# Patient Record
Sex: FEMALE | Race: WHITE | Employment: FULL TIME | URBAN - METROPOLITAN AREA
[De-identification: names, ages, dates, MRNs, and addresses within clinical notes are randomized per-mention and may not be internally consistent; named-entity substitution may affect disease eponyms.]

---

## 2017-09-06 ENCOUNTER — TELEPHONE (OUTPATIENT)
Dept: OBGYN | Facility: CLINIC | Age: 32
End: 2017-09-06

## 2017-09-06 DIAGNOSIS — Z34.91 NORMAL PREGNANCY IN FIRST TRIMESTER: Primary | ICD-10-CM

## 2017-09-06 NOTE — TELEPHONE ENCOUNTER
Patient called and would like to establish prenatal care   Patient LMP 8/6/17  Patient G1 P  Patient complains of having breast tenderness  Patient is taking prenatal vitamins

## 2017-10-02 ENCOUNTER — OFFICE VISIT (OUTPATIENT)
Dept: OBGYN | Facility: CLINIC | Age: 32
End: 2017-10-02
Attending: ADVANCED PRACTICE MIDWIFE
Payer: COMMERCIAL

## 2017-10-02 VITALS
BODY MASS INDEX: 27.82 KG/M2 | DIASTOLIC BLOOD PRESSURE: 77 MMHG | HEART RATE: 73 BPM | SYSTOLIC BLOOD PRESSURE: 126 MMHG | WEIGHT: 164.6 LBS

## 2017-10-02 DIAGNOSIS — Z34.91 NORMAL PREGNANCY IN FIRST TRIMESTER: ICD-10-CM

## 2017-10-02 DIAGNOSIS — O20.0 THREATENED ABORTION: Primary | ICD-10-CM

## 2017-10-02 PROCEDURE — 99212 OFFICE O/P EST SF 10 MIN: CPT | Mod: 25,ZF

## 2017-10-02 PROCEDURE — 76817 TRANSVAGINAL US OBSTETRIC: CPT | Mod: ZF

## 2017-10-02 RX ORDER — PRENATAL VIT/IRON FUM/FOLIC AC 27MG-0.8MG
1 TABLET ORAL DAILY
COMMUNITY
End: 2017-11-01

## 2017-10-02 NOTE — MR AVS SNAPSHOT
"              After Visit Summary   10/2/2017    Aspen Gonzalez    MRN: [id number]           Patient Information     Date Of Birth          1985        Visit Information        Provider Department      10/2/2017 1:30 PM Megan Ramirez APRN CNM [facility]        Today's Diagnoses     Threatened     -  1       Follow-ups after your visit        Follow-up notes from your care team     Discussed this visit Return in about 2 weeks (around 10/16/2017) for US and CNM follow up.      Your next 10 appointments already scheduled     Oct 16, 2017  9:30 AM CDT   ULTRASOUND with [facility] ULTRASOUND   [facility] ([facility])    [facility]  [address]  [facility] [address]   [phone]            Oct 16, 2017 10:00 AM CDT   OB INTAKE with MAXIMILIANO White CNM   [facility] ([facility])    [facility]  [address]  [facility] [address]   [phone]           Congratulations! You're Pregnant.  At [facility] we think of you as part of our team, working together toward a successful pregnancy and a healthy baby.  You might already have questions about all the different things that are happening to your body.  We have attached a link to our book \"The Expectant Family- From Pregnancy through Childbirth\" [email or web address]/830213.pdf to help answer some of those concerns. (You will be given a hard copy at your first visit in clinic)  Chapter 2 (page 20) is a must read- from questions about morning sickness, headaches, warning signs to look for, to:\"why do I have to go to the bathroom so often?\"   Our Doctors, Resident Physicians, Certified nurse midwives do work closely together as a team both in clinic and in the hospital to make this experience remarkable. Our patients have on numerous occasions expressed " their satisfaction in knowing that there is always a provider at the hospital or on the phone no matter what time of the day it is, to talk, triage or deliver their babies.  Your time is very important to us, so we will like you to know what to expect.  Routine Prenatal Visit Schedule:  Visit 1: 8 Weeks - Dating Ultrasound and Intake with Nurse  Visit 2: 10-12 Weeks- First Prenatal Visit & Exam with Midwife or Resident Physician, scheduling for early genetic screening at [facility] (optional)  Visit 3: 16-18 Weeks 18-20 Weeks- Level II Ultrasound done at [facility]- 4th Floor  Visit 4: 22-24 weeks  Visit 5: 28 Weeks- Extended Education Visit with Midwife or Resident Physician  Visit 6: 32 Weeks  Visit 7: 36 Weeks  Visit 8-11: 38-41 Weeks (Weekly Visits)  Changes can or may occur during your pregnancy. Your provider may ask you to come in more often for testing or monitoring frequently than stated above.  If you still have questions our triage nurses will be more than welcome to help you. Send us a message via MY Chart, our secure health Portal or give us a call anytime at [phone].  Thank You for allowing us to be part of your care.  Yours sincerely,  [facility]              Who to contact     Please call your clinic at [phone] to:    Ask questions about your health    Make or cancel appointments    Discuss your medicines    Learn about your test results    Speak to your doctor   If you have compliments or concerns about an experience at your clinic, or if you wish to file a complaint, please contact [facility] Patient Relations at [phone] or email us at [email or web address] [email or web address]         Additional Information About Your Visit        MyChart Information     [employer]hart gives you secure access to your electronic health record. If you see a primary care provider, you can also send messages to your care team and make appointments. If you have  questions, please call your primary care clinic.  If you do not have a primary care provider, please call [phone] and they will assist you.      IP[employer]less is an electronic gateway that provides easy, online access to your medical records. With IPWireless, you can request a clinic appointment, read your test results, renew a prescription or communicate with your care team.     To access your existing account, please contact your [facility] or call [phone] for assistance.        Care EveryWhere ID     This is your Care EveryWhere ID. This could be used by other organizations to access your [facility] medical records  [id number]        Your Vitals Were     Pulse BMI (Body Mass Index)                73 27.82 kg/m2           Blood Pressure from Last 3 Encounters:   10/02/17 126/77   03/19/14 106/71    Weight from Last 3 Encounters:   10/02/17 74.7 kg (164 lb 9.6 oz)   03/19/14 68.9 kg (152 lb)              Today, you had the following     No orders found for display       Primary Care Provider Office Phone # Fax #    Maikel Apple [phone]       [facility] [address]   [facility] [address]        Equal Access to Services     GLYNN PIERCE : Hadii brent ku hadasho Soomaali, waaxda luqadaha, qaybta kaalmada adeegyada, crissy corbin. So [facility] [phone].    ATENCIÓN: Si habla español, tiene a shafer disposición servicios gratuitos de asistencia lingüística. Llame al [phone].    We comply with applicable federal civil rights laws and [place] laws. We do not discriminate on the basis of race, color, national origin, age, disability, sex, sexual orientation, or gender identity.            Thank you!     Thank you for choosing [facility]  for your care. Our goal is always to provide you with excellent care. Hearing back from our patients is one way we can continue to improve our  services. Please take a few minutes to complete the written survey that you may receive in the mail after your visit with us. Thank you!             Your Updated Medication List - Protect others around you: Learn how to safely use, store and throw away your medicines at www.disposemymeds.org.          This list is accurate as of: 10/2/17 11:59 PM.  Always use your most recent med list.                   Brand Name Dispense Instructions for use Diagnosis    prenatal multivitamin plus iron 27-0.8 MG Tabs per tablet      Take 1 tablet by mouth daily

## 2017-10-02 NOTE — LETTER
10/2/2017     RE: Aspen Gonzalez  [address] [address]     Dear Colleague,    Thank you for referring your patient, Aspen Gonzalez, to the [facility] at [facility]. Please see a copy of my visit note below.    32 year old female, ., with LMP 17 8 1/7weeks,  Estimated Date of Delivery: 18  presents for confirmation of dates and assessment of viability. This study was done transvaginally.    Measurements     CRL = 5.6 mm = 6 12/7 weeks  EGA.   JESSIE = 18.     Fetal anatomy appears abnormal for gestational age. Irrgegular gestational sac and yolk sac.     Fetal/Fetal Cardiac Activity: Absent.       Implantation: Intrauterine.     Cervix = 4.1 cm      Maternal structures:    Right ovarian hemorrhagic cyst-2.1 x 1.7 x 2.0cm    Impression: Missed  versus early pregnancy.  CRL < 7 mm.  Given JESSIE by LMP and appearance of pregnancy on US today, likely missed .  Consider repeat scan in 1-2 week to confirm viability of pregnancy    Recommend comprehensive scan at 18 to 20 weeks.    MEGHANA Chen MD

## 2017-10-02 NOTE — PROGRESS NOTES
Chief Complaint:  Missed AB vs Early pregnancy    SUBJECTIVE: Aspen Gonzalez is a 32 year old female    At 8w1d  by LMP but 6w2d by US with no cardiac activity to review result of ultrasound.      Pt had US today that showed CRL 5.6mm with no fetal heart rate, irregular gestational sac dates ~2 weeks earlier than she expected by exact dates of conception.   The patient has been experiencing no bleeding or cramping.     Review Of Systems   ROS: 10 point ROS neg other than the symptoms noted above in the HPI.      OBJECTIVE: Blood pressure 126/77, pulse 73, weight 74.7 kg (164 lb 9.6 oz).   Physical exam is deferred.         ASSESSMENT:  -  Missed AB vs early pregnancy   - Rh positve per pt     PLAN:   - Offered support, encouraged self care.  Offered coordination with mental health services prn.    - Reviewed inconclusive ultrasound per [facility] consensus - follow up in 2 weeks and prn.   -  Reviewed how/why to call or present to the emergency room if she were to develop heavy bleeding saturating a maxi pad more frequently than every hour or passing large clots.  Rhogam not indicated    All pt's and partner's questions were discussed and answered. Pt and her partner verbalized understanding of and agreement to plan of care.      100% of this 30 minute appointment was spent in face to face counseling and coordination care as above.      Megan VIERA CNM

## 2017-10-02 NOTE — LETTER
10/2/2017       RE: Aspen Gonzalez  [address] [address]     Dear Colleague,    Thank you for referring your patient, Aspen Gonzalez, to the [facility] at [facility]. Please see a copy of my visit note below.    Chief Complaint:  Missed AB vs Early pregnancy    SUBJECTIVE: Aspen Gonzalez is a 32 year old female    At 8w1d  by LMP but 6w2d by US with no cardiac activity to review result of ultrasound.      Pt had US today that showed CRL 5.6mm with no fetal heart rate, irregular gestational sac dates ~2 weeks earlier than she expected by exact dates of conception.   The patient has been experiencing no bleeding or cramping.     Review Of Systems   ROS: 10 point ROS neg other than the symptoms noted above in the HPI.      OBJECTIVE: Blood pressure 126/77, pulse 73, weight 74.7 kg (164 lb 9.6 oz).   Physical exam is deferred.         ASSESSMENT:  -  Missed AB vs early pregnancy   - Rh positve per pt     PLAN:   - Offered support, encouraged self care.  Offered coordination with mental health services prn.    - Reviewed inconclusive ultrasound per [facility] consensus - follow up in 2 weeks and prn.   -  Reviewed how/why to call or present to the emergency room if she were to develop heavy bleeding saturating a maxi pad more frequently than every hour or passing large clots.  Rhogam not indicated    All pt's and partner's questions were discussed and answered. Pt and her partner verbalized understanding of and agreement to plan of care.      100% of this 30 minute appointment was spent in face to face counseling and coordination care as above.      Megan VIERA CNM          Again, thank you for allowing me to participate in the care of your patient.      Sincerely,    MAXIMILIANO Davis CNM

## 2017-10-02 NOTE — MR AVS SNAPSHOT
After Visit Summary   10/2/2017    Aspen Gonzalez    MRN: [id number]           Patient Information     Date Of Birth          1985        Visit Information        Provider Department      10/2/2017 1:00 PM [facility] ULTRASOUND [facility]        Today's Diagnoses     Normal pregnancy in first trimester           Follow-ups after your visit        Your next 10 appointments already scheduled     Oct 16, 2017  9:30 AM CDT   ULTRASOUND with [facility] ULTRASOUND   [facility] ([facility])    [facility] [address]  [address]  [address]  [facility] [address] [phone]            Oct 16, 2017 10:00 AM CDT   NEW OB with MAXIMILIANO White CNM   [facility] ([facility])    [facility]  [address]  [address]  [facility] [address] [phone]              Who to contact     Please call your clinic at [phone] to:    Ask questions about your health    Make or cancel appointments    Discuss your medicines    Learn about your test results    Speak to your doctor   If you have compliments or concerns about an experience at your clinic, or if you wish to file a complaint, please contact [facility] Patient Relations at [phone] or email us at [email or web address]         Additional Information About Your Visit        MyChart Information     [employer] gives you secure access to your electronic health record. If you see a primary care provider, you can also send messages to your care team and make appointments. If you have questions, please call your primary care clinic.  If you do not have a primary care provider, please call [phone] and they will assist you.      [employer] is an electronic gateway that provides easy, online access to your medical records. With [employer], you can request a clinic appointment,  read your test results, renew a prescription or communicate with your care team.     To access your existing account, please contact your [facility] or call [phone] for assistance.        Care EveryWhere ID     This is your Care EveryWhere ID. This could be used by other organizations to access your [place] medical records  [id number]         Blood Pressure from Last 3 Encounters:   10/02/17 126/77   03/19/14 106/71    Weight from Last 3 Encounters:   10/02/17 74.7 kg (164 lb 9.6 oz)   03/19/14 68.9 kg (152 lb)              We Performed the Following     Dating ultrasound less than 14 weeks gestation Transvag  - [id number] (In Clinic)        Primary Care Provider Office Phone # Fax #    Maikel Apple -[phone]       [facility] [address]   [facility] [address]        Equal Access to Services     [facility] : Hadii aad ku hadasho Soomaali, waaxda luqadaha, qaybta kaalmada adeegyada, waxay idiin hayaan yesika boatengarakarla ruiz . So [facility] [phone].    ATENCIÓN: Si habla español, tiene a shafer disposición servicios gratuitos de asistencia lingüística. Llame al [phone].    We comply with applicable federal civil rights laws and [place] laws. We do not discriminate on the basis of race, color, national origin, age, disability, sex, sexual orientation, or gender identity.            Thank you!     Thank you for choosing [facility]  for your care. Our goal is always to provide you with excellent care. Hearing back from our patients is one way we can continue to improve our services. Please take a few minutes to complete the written survey that you may receive in the mail after your visit with us. Thank you!             Your Updated Medication List - Protect others around you: Learn how to safely use, store and throw away your medicines at www.disposemymeds.org.          This list is accurate as of:  10/2/17  3:38 PM.  Always use your most recent med list.                   Brand Name Dispense Instructions for use Diagnosis    prenatal multivitamin plus iron 27-0.8 MG Tabs per tablet      Take 1 tablet by mouth daily

## 2017-10-02 NOTE — LETTER
10/2/2017      RE: Aspen Gonzalez  [address]  [place] [address]       32 year old female, ., with LMP 17 8 1/7weeks,  Estimated Date of Delivery: 18  presents for confirmation of dates and assessment of viability. This study was done transvaginally.    Measurements     CRL = 5.6 mm = 6 12/7 weeks  EGA.   JESSIE = 18.     Fetal anatomy appears abnormal for gestational age. Irrgegular gestational sac and yolk sac.     Fetal/Fetal Cardiac Activity: Absent.       Implantation: Intrauterine.     Cervix = 4.1 cm      Maternal structures:    Right ovarian hemorrhagic cyst-2.1 x 1.7 x 2.0cm    Impression: Missed  versus early pregnancy.  CRL < 7 mm.  Given JESSIE by LMP and appearance of pregnancy on US today, likely missed .  Consider repeat scan in 1-2 week to confirm viability of pregnancy    Recommend comprehensive scan at 18 to 20 weeks.    MEGHANA Chen MD    S Ultrasound

## 2017-10-02 NOTE — PROGRESS NOTES
32 year old female, ., with LMP 17 8 1/7weeks,  Estimated Date of Delivery: 18  presents for confirmation of dates and assessment of viability. This study was done transvaginally.    Measurements     CRL = 5.6 mm = 6 12/7 weeks  EGA.   JESSIE = 18.     Fetal anatomy appears abnormal for gestational age. Irrgegular gestational sac and yolk sac.     Fetal/Fetal Cardiac Activity: Absent.       Implantation: Intrauterine.     Cervix = 4.1 cm      Maternal structures:    Right ovarian hemorrhagic cyst-2.1 x 1.7 x 2.0cm    Impression: Missed  versus early pregnancy.  CRL < 7 mm.  Given JESSIE by LMP and appearance of pregnancy on US today, likely missed .  Consider repeat scan in 1-2 week to confirm viability of pregnancy    Recommend comprehensive scan at 18 to 20 weeks.    MEGHANA Chen MD

## 2017-10-05 PROBLEM — O20.0 THREATENED ABORTION: Status: ACTIVE | Noted: 2017-10-05

## 2017-10-16 ENCOUNTER — OFFICE VISIT (OUTPATIENT)
Dept: OBGYN | Facility: CLINIC | Age: 32
End: 2017-10-16
Payer: COMMERCIAL

## 2017-10-16 ENCOUNTER — OFFICE VISIT (OUTPATIENT)
Dept: OBGYN | Facility: CLINIC | Age: 32
End: 2017-10-16
Attending: ADVANCED PRACTICE MIDWIFE
Payer: COMMERCIAL

## 2017-10-16 VITALS
HEART RATE: 73 BPM | HEIGHT: 64 IN | BODY MASS INDEX: 27.81 KG/M2 | SYSTOLIC BLOOD PRESSURE: 126 MMHG | DIASTOLIC BLOOD PRESSURE: 78 MMHG | WEIGHT: 162.9 LBS

## 2017-10-16 DIAGNOSIS — Q51.28 SEPTATE UTERUS: ICD-10-CM

## 2017-10-16 DIAGNOSIS — O36.80X0 ENCOUNTER TO DETERMINE FETAL VIABILITY OF PREGNANCY, SINGLE OR UNSPECIFIED FETUS: Primary | ICD-10-CM

## 2017-10-16 DIAGNOSIS — O02.1 MISSED AB: Primary | ICD-10-CM

## 2017-10-16 LAB
ERYTHROCYTE [DISTWIDTH] IN BLOOD BY AUTOMATED COUNT: 12.7 % (ref 10–15)
HCT VFR BLD AUTO: 39.8 % (ref 35–47)
HGB BLD-MCNC: 13.1 G/DL (ref 11.7–15.7)
MCH RBC QN AUTO: 30.8 PG (ref 26.5–33)
MCHC RBC AUTO-ENTMCNC: 32.9 G/DL (ref 31.5–36.5)
MCV RBC AUTO: 94 FL (ref 78–100)
PLATELET # BLD AUTO: 238 10E9/L (ref 150–450)
RBC # BLD AUTO: 4.25 10E12/L (ref 3.8–5.2)
WBC # BLD AUTO: 6.9 10E9/L (ref 4–11)

## 2017-10-16 PROCEDURE — 76817 TRANSVAGINAL US OBSTETRIC: CPT | Mod: ZF

## 2017-10-16 PROCEDURE — 36415 COLL VENOUS BLD VENIPUNCTURE: CPT | Performed by: ADVANCED PRACTICE MIDWIFE

## 2017-10-16 PROCEDURE — 85027 COMPLETE CBC AUTOMATED: CPT | Performed by: ADVANCED PRACTICE MIDWIFE

## 2017-10-16 RX ORDER — DOXYCYCLINE 100 MG/10ML
100 INJECTION, POWDER, LYOPHILIZED, FOR SOLUTION INTRAVENOUS
Status: CANCELLED | OUTPATIENT
Start: 2017-10-16

## 2017-10-16 NOTE — MR AVS SNAPSHOT
After Visit Summary   10/16/2017    Aspen Gonzalez    MRN: [id number]           Patient Information     Date Of Birth          1985        Visit Information        Provider Department      10/16/2017 10:00 AM Megan Ramirez APRN CNM [facility]        Today's Diagnoses     Missed ab    -  1    Septate uterus - possible identified on dating US           Follow-ups after your visit        Follow-up notes from your care team     Discussed this visit Return if symptoms worsen or fail to improve, for Follow up on MAB.      Your next 10 appointments already scheduled     Nov 02, 2017  1:15 PM CDT   Return Visit with Marlee Baugh MD   [facility] ([facility])    [facility]  [address]  [address]  [facility] [address] [phone]              Who to contact     Please call your clinic at [phone] to:    Ask questions about your health    Make or cancel appointments    Discuss your medicines    Learn about your test results    Speak to your doctor   If you have compliments or concerns about an experience at your clinic, or if you wish to file a complaint, please contact [facility] Patient Relations at [phone] or email us at [email or web address]         Additional Information About Your Visit        MyChart Information     [employer] gives you secure access to your electronic health record. If you see a primary care provider, you can also send messages to your care team and make appointments. If you have questions, please call your primary care clinic.  If you do not have a primary care provider, please call [phone] and they will assist you.      [employer] is an electronic gateway that provides easy, online access to your medical records. With [employer], you can request a clinic appointment, read your test results, renew a prescription or communicate  "with your care team.     To access your existing account, please contact your [facility] or call [phone] for assistance.        Care EveryWhere ID     This is your Care EveryWhere ID. This could be used by other organizations to access your [place] medical records  [id number]        Your Vitals Were     Pulse Height BMI (Body Mass Index)             73 1.626 m (5' 4\") 27.96 kg/m2          Blood Pressure from Last 3 Encounters:   10/20/17 104/67   10/16/17 126/78   10/02/17 126/77    Weight from Last 3 Encounters:   10/20/17 74 kg (163 lb 2.3 oz)   10/16/17 73.9 kg (162 lb 14.4 oz)   10/02/17 74.7 kg (164 lb 9.6 oz)              We Performed the Following     CBC with Platelets     Maria Guadalupe-Operative Worksheet (Suction Dilation and Curettage)        Primary Care Provider Office Phone # Fax #    Maikel Apple -[phone]       [facility] [address]   [facility] [address]        Equal Access to Services     [facility] Hospital[facility] : Hadii brent ku hadasho Soana, waaxda luqadaha, qaybta kaalmada curtis, crissy ruiz . So [place] [phone].    ATENCIÓN: Si habla español, tiene a shafer disposición servicios gratuitos de asistencia lingüística. [facility] [phone].    We comply with applicable federal civil rights laws and [place] laws. We do not discriminate on the basis of race, color, national origin, age, disability, sex, sexual orientation, or gender identity.            Thank you!     Thank you for choosing [facility]  for your care. Our goal is always to provide you with excellent care. Hearing back from our patients is one way we can continue to improve our services. Please take a few minutes to complete the written survey that you may receive in the mail after your visit with us. Thank you!             Your Updated Medication List - Protect others around you: Learn how to safely " use, store and throw away your medicines at www.disposemymeds.org.          This list is accurate as of: 10/16/17 11:59 PM.  Always use your most recent med list.                   Brand Name Dispense Instructions for use Diagnosis    prenatal multivitamin plus iron 27-0.8 MG Tabs per tablet      Take 1 tablet by mouth daily

## 2017-10-16 NOTE — LETTER
10/16/2017       RE: Aspen Gonzalez  [address] [facility] [address]     Dear Colleague,    Thank you for referring your patient, Aspen Gonzalez, to the [facility] at [facility]. Please see a copy of my visit note below.    Chief Complaint:  Missed AB    SUBJECTIVE: Aspen Gonzalez is a 32 year old female    Presents with her partner Masoud to review result of ultrasound.      Pt had initial OB Intake US done which showed no FHR but CRL<7mm, agreed to follow up US.  The US today confirms missed AB, no fetal heart rate after 2 weeks and noted decrease in pregnancy size.  Patient is seen here and informed of the results. Pt expresses appropriate sadness at loss, has adequate support from her  who is present.  Reassured that the loss could not have been stopped by her actions or any other persons actions.  The patient has been experiencing no cramping or bleeding.     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      Review Of Systems   ROS: 10 point ROS neg other than the symptoms noted above in the HPI.    Past Surgical History:   Procedure Laterality Date     NO HISTORY OF SURGERY       Past Medical History:   Diagnosis Date     History of carbon monoxide poisoning     age 7, hospitalized x 2 days     Septate uterus - possible noted on OB intake US 10/16/2017         Family History   Problem Relation Age of Onset     Breast Cancer No family hx of      Cancer - colorectal No family hx of      Prostate Cancer No family hx of      C.A.D. No family hx of      DIABETES No family hx of      Obstetric History       T0      L0     SAB0   TAB0   Ectopic0   Multiple0   Live Births0       # Outcome Date GA Lbr Chinedu/2nd Weight Sex Delivery Anes PTL Lv   1 SAB 10/16/17                    OBJECTIVE: Blood pressure 126/78, pulse 73, height 1.626  "m (5' 4\"), weight 73.9 kg (162 lb 14.4 oz).    Body mass index is 27.96 kg/(m^2).    GENERAL APPEARANCE: healthy, alert, no distress, cooperative and appropriately responding to confirmed MAB  RESP: lungs clear to auscultation - no rales, rhonchi or wheezes  CV: regular rates and rhythm, normal S1 S2, no S3 or S4 and no murmur, click or rub  PSYCH: mentation appears normal, judgement in take, rational thought process          ASSESSMENT:  -  Confirmed Missed AB  - Rh positive per pt   - Possible septate uterus noted on US    PLAN:   - Offered support, encouraged self care.  Offered coordination with mental health services prn.     - Education on management options provided. Reviewed medical, surgical, and expectant management and expectations of bleeding.   Discussed possible causes of miscarriage including chromosome abnormalities.  Pt verbalized understanding that nothing can be done to prevent a miscarriage from occuring.  Pt prefers in office D&C but no slots available until 10/27/17 and doesn't want to wait that long.  Pt desires D&C as soon as can be scheduled.  Andrzej GRACIA on call paged to place orders, OR  updated - will contact pt asap once orders received.  Pt will have confirmatory T&S and CBC done. Confirmed call back number correct in pt's chart.     -  Reviewed how/why to call or present to the emergency room if she were to develop heavy bleeding saturating a maxi pad more frequently than every hour or passing large clots.   Reviewed how/why If she is bleeding longer than one week or it is heavy, recommended she follow-up for possible D&C. Pt instructed to call clinic if she develops a fever. Reviewed Ibuprofen for the cramping, up to 800mg every 8 hours.     Rhogam not indicated per pt reported blood type, T&S ordered for confirmation.     Briefly reviewed possible uterine septate noted by ultrasonographer - encouraged pt to follow up with MD team to discuss before attempting future pregnancy. "  All pt's and pt's partners questions were briefly discussed but encouraged thorough follow up with MD team.     Pt verbalized understanding of and agreement to plan of care.      Over 75% of this 30 minute appointment was spent in face to face counseling and coordination care as above.    All pt's and pt's partner's questions discussed and answered.  Pt and her partner verbalized understanding of and agreement to plan of care.       Again, thank you for allowing me to participate in the care of your patient.      Sincerely,    MAXIMILIANO Davis CNM

## 2017-10-16 NOTE — PROGRESS NOTES
32 year old female, , with LMP 2017. 8 2/7 weeks by early ultrasound not consistent with LMP.   presents for confirmation of dates and assessment of viability. This study was done transvaginally.    Measurements     CRL = 3.0 mm = 5 6/7 weeks  EGA.       Small fetal pole and yolk sac identified.     Fetal/Fetal Cardiac Activity: Absent.      Implantation: Intrauterine.     Cervix = 3.7 cm      Maternal structures - possible septate uterus.    Impression: Missed  based on follow up of previous US on 10/2/17.         MEGHANA Madera MD

## 2017-10-16 NOTE — MR AVS SNAPSHOT
After Visit Summary   10/16/2017    Aspen Gonzalez    MRN: [id number]           Patient Information     Date Of Birth          1985        Visit Information        Provider Department      10/16/2017 9:30 AM [facility]TR[facility]        Today's Diagnoses     Encounter to determine fetal viability of pregnancy, single or unspecified fetus    -  1       Follow-ups after your visit        Future tests that were ordered for you today     Open Future Orders        Priority Expected Expires Ordered    ABO/Rh Type and Screen Routine  11/15/2017 10/16/2017            Who to contact     Please call your clinic at [phone] to:    Ask questions about your health    Make or cancel appointments    Discuss your medicines    Learn about your test results    Speak to your doctor   If you have compliments or concerns about an experience at your clinic, or if you wish to file a complaint, please contact [facility] Patient Relations at [phone] or email us at [email or web address]         Additional Information About Your Visit        MyChart Information     [employer] gives you secure access to your electronic health record. If you see a primary care provider, you can also send messages to your care team and make appointments. If you have questions, please call your primary care clinic.  If you do not have a primary care provider, please call [phone] and they will assist you.      [employer] is an electronic gateway that provides easy, online access to your medical records. With [employer], you can request a clinic appointment, read your test results, renew a prescription or communicate with your care team.     To access your existing account, please contact your [facility] or call [phone] for assistance.        Care EveryWhere ID     This is your Care EveryWhere ID. This could be used by other  organizations to access your [facility] medical records  [id number]         Blood Pressure from Last 3 Encounters:   10/16/17 126/78   10/02/17 126/77   03/19/14 106/71    Weight from Last 3 Encounters:   10/16/17 73.9 kg (162 lb 14.4 oz)   10/02/17 74.7 kg (164 lb 9.6 oz)   03/19/14 68.9 kg (152 lb)               Primary Care Provider Office Phone # Fax #    Maikel oMshe Apple [phone]       [facility] [address]   [facility] [address]        Equal Access to Services     SHELLY PIERCE : Hadii brent ramos hadasho Soana, waaxda luqadaha, qaybta kaalmada adealanayada, crissy corbin. So [facility] [phone].    ATENCIÓN: Si habla español, tiene a shafer disposición servicios gratuitos de asistencia lingüística. [facility] [phone].    We comply with applicable federal civil rights laws and [place] laws. We do not discriminate on the basis of race, color, national origin, age, disability, sex, sexual orientation, or gender identity.            Thank you!     Thank you for choosing [facility]  for your care. Our goal is always to provide you with excellent care. Hearing back from our patients is one way we can continue to improve our services. Please take a few minutes to complete the written survey that you may receive in the mail after your visit with us. Thank you!             Your Updated Medication List - Protect others around you: Learn how to safely use, store and throw away your medicines at www.disposemymeds.org.          This list is accurate as of: 10/16/17 11:30 AM.  Always use your most recent med list.                   Brand Name Dispense Instructions for use Diagnosis    prenatal multivitamin plus iron 27-0.8 MG Tabs per tablet      Take 1 tablet by mouth daily

## 2017-10-16 NOTE — PROGRESS NOTES
"Chief Complaint:  Missed AB    SUBJECTIVE: Aspen Gonzalez is a 32 year old female    Presents with her partner Masoud to review result of ultrasound.      Pt had initial OB Intake US done which showed no FHR but CRL<7mm, agreed to follow up US.  The US today confirms missed AB, no fetal heart rate after 2 weeks and noted decrease in pregnancy size.  Patient is seen here and informed of the results. Pt expresses appropriate sadness at loss, has adequate support from her  who is present.  Reassured that the loss could not have been stopped by her actions or any other persons actions.  The patient has been experiencing no cramping or bleeding.     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      Review Of Systems   ROS: 10 point ROS neg other than the symptoms noted above in the HPI.    Past Surgical History:   Procedure Laterality Date     NO HISTORY OF SURGERY       Past Medical History:   Diagnosis Date     History of carbon monoxide poisoning     age 7, hospitalized x 2 days     Septate uterus - possible noted on OB intake US 10/16/2017         Family History   Problem Relation Age of Onset     Breast Cancer No family hx of      Cancer - colorectal No family hx of      Prostate Cancer No family hx of      C.A.D. No family hx of      DIABETES No family hx of      Obstetric History       T0      L0     SAB0   TAB0   Ectopic0   Multiple0   Live Births0       # Outcome Date GA Lbr Chinedu/2nd Weight Sex Delivery Anes PTL Lv   1 SAB 10/16/17                    OBJECTIVE: Blood pressure 126/78, pulse 73, height 1.626 m (5' 4\"), weight 73.9 kg (162 lb 14.4 oz).    Body mass index is 27.96 kg/(m^2).    GENERAL APPEARANCE: healthy, alert, no distress, cooperative and appropriately responding to confirmed MAB  RESP: lungs clear to auscultation - no rales, rhonchi or wheezes  CV: regular rates and rhythm, normal S1 S2, no S3 or S4 " and no murmur, click or rub  PSYCH: mentation appears normal, judgement in take, rational thought process          ASSESSMENT:  -  Confirmed Missed AB  - Rh positive per pt   - Possible septate uterus noted on US    PLAN:   - Offered support, encouraged self care.  Offered coordination with mental health services prn.     - Education on management options provided. Reviewed medical, surgical, and expectant management and expectations of bleeding.   Discussed possible causes of miscarriage including chromosome abnormalities.  Pt verbalized understanding that nothing can be done to prevent a miscarriage from occuring.  Pt prefers in office D&C but no slots available until 10/27/17 and doesn't want to wait that long.  Pt desires D&C as soon as can be scheduled.  Andrzej GRACIA on call paged to place orders, OR  updated - will contact pt asap once orders received.  Pt will have confirmatory T&S and CBC done. Confirmed call back number correct in pt's chart.     -  Reviewed how/why to call or present to the emergency room if she were to develop heavy bleeding saturating a maxi pad more frequently than every hour or passing large clots.   Reviewed how/why If she is bleeding longer than one week or it is heavy, recommended she follow-up for possible D&C. Pt instructed to call clinic if she develops a fever. Reviewed Ibuprofen for the cramping, up to 800mg every 8 hours.     Rhogam not indicated per pt reported blood type, T&S ordered for confirmation.     Briefly reviewed possible uterine septate noted by ultrasonographer - encouraged pt to follow up with MD team to discuss before attempting future pregnancy.  All pt's and pt's partners questions were briefly discussed but encouraged thorough follow up with MD team.     Pt verbalized understanding of and agreement to plan of care.      Over 75% of this 30 minute appointment was spent in face to face counseling and coordination care as above.    All pt's and pt's  partner's questions discussed and answered.  Pt and her partner verbalized understanding of and agreement to plan of care.        Megan VIERA CNM

## 2017-10-17 ENCOUNTER — TELEPHONE (OUTPATIENT)
Dept: OBGYN | Facility: CLINIC | Age: 32
End: 2017-10-17

## 2017-10-17 NOTE — TELEPHONE ENCOUNTER
Confirmed surgery date, time and location with patient 10/20/17 with arrival time at 7:00a.m with nothing to eat eight hours before scheduled surgery time and clear liquids up to two hours before scheduled surgery time.     to complete the following fields:            CHECKLIST     Google Calendar : Yes     Resident notified: Not Applicable     Clinic schedule blocked: Not Applicable    Patient notified:Yes      Pre op information sent: Yes     Given to patient over the phone.Yes    Comments:

## 2017-10-17 NOTE — TELEPHONE ENCOUNTER
----- Message from Cedric Pichardonick sent at 10/16/2017  4:17 PM CDT -----  Regarding: PT is following up from her apptointment this morning about D&C  Contact: [phone]  PT is following up from her appointment on 10/16/17 about her miscarriage and discussion about D&C.  PT would like a call back and can be reached at [phone].    Thank you,  Cedric    Call Center

## 2017-10-17 NOTE — TELEPHONE ENCOUNTER
Returned pt call regarding question about scheduling D&C after missed AB. Pt states she is hoping to get in this week. Transferred call to Stephen for scheduling.

## 2017-10-19 ENCOUNTER — ANESTHESIA EVENT (OUTPATIENT)
Dept: SURGERY | Facility: CLINIC | Age: 32
End: 2017-10-19
Payer: COMMERCIAL

## 2017-10-20 ENCOUNTER — SURGERY (OUTPATIENT)
Age: 32
End: 2017-10-20

## 2017-10-20 ENCOUNTER — HOSPITAL ENCOUNTER (OUTPATIENT)
Facility: CLINIC | Age: 32
Discharge: HOME | End: 2017-10-20
Attending: OBSTETRICS & GYNECOLOGY | Admitting: OBSTETRICS & GYNECOLOGY
Payer: COMMERCIAL

## 2017-10-20 ENCOUNTER — ANESTHESIA (OUTPATIENT)
Dept: SURGERY | Facility: CLINIC | Age: 32
End: 2017-10-20
Payer: COMMERCIAL

## 2017-10-20 VITALS
OXYGEN SATURATION: 96 % | RESPIRATION RATE: 16 BRPM | HEIGHT: 64 IN | BODY MASS INDEX: 27.85 KG/M2 | DIASTOLIC BLOOD PRESSURE: 67 MMHG | TEMPERATURE: 99 F | WEIGHT: 163.14 LBS | SYSTOLIC BLOOD PRESSURE: 104 MMHG

## 2017-10-20 LAB
ABO + RH BLD: NORMAL
ABO + RH BLD: NORMAL
BLD GP AB SCN SERPL QL: NORMAL
BLOOD BANK CMNT PATIENT-IMP: NORMAL
GLUCOSE SERPL-MCNC: 87 MG/DL (ref 70–99)
SPECIMEN EXP DATE BLD: NORMAL

## 2017-10-20 PROCEDURE — 88342 IMHCHEM/IMCYTCHM 1ST ANTB: CPT | Performed by: OBSTETRICS & GYNECOLOGY

## 2017-10-20 PROCEDURE — 37000009 ZZH ANESTHESIA TECHNICAL FEE, EACH ADDTL 15 MIN: Performed by: OBSTETRICS & GYNECOLOGY

## 2017-10-20 PROCEDURE — 86901 BLOOD TYPING SEROLOGIC RH(D): CPT | Performed by: OBSTETRICS & GYNECOLOGY

## 2017-10-20 PROCEDURE — 36415 COLL VENOUS BLD VENIPUNCTURE: CPT | Performed by: OBSTETRICS & GYNECOLOGY

## 2017-10-20 PROCEDURE — 25000128 H RX IP 250 OP 636: Performed by: NURSE ANESTHETIST, CERTIFIED REGISTERED

## 2017-10-20 PROCEDURE — 40000170 ZZH STATISTIC PRE-PROCEDURE ASSESSMENT II: Performed by: OBSTETRICS & GYNECOLOGY

## 2017-10-20 PROCEDURE — 71000027 ZZH RECOVERY PHASE 2 EACH 15 MINS: Performed by: OBSTETRICS & GYNECOLOGY

## 2017-10-20 PROCEDURE — 86850 RBC ANTIBODY SCREEN: CPT | Performed by: OBSTETRICS & GYNECOLOGY

## 2017-10-20 PROCEDURE — 82947 ASSAY GLUCOSE BLOOD QUANT: CPT | Performed by: ANESTHESIOLOGY

## 2017-10-20 PROCEDURE — 88342 IMHCHEM/IMCYTCHM 1ST ANTB: CPT | Mod: 26 | Performed by: OBSTETRICS & GYNECOLOGY

## 2017-10-20 PROCEDURE — 25000125 ZZHC RX 250: Performed by: OBSTETRICS & GYNECOLOGY

## 2017-10-20 PROCEDURE — 36000053 ZZH SURGERY LEVEL 2 EA 15 ADDTL MIN - UMMC: Performed by: OBSTETRICS & GYNECOLOGY

## 2017-10-20 PROCEDURE — 27210794 ZZH OR GENERAL SUPPLY STERILE: Performed by: OBSTETRICS & GYNECOLOGY

## 2017-10-20 PROCEDURE — 88305 TISSUE EXAM BY PATHOLOGIST: CPT | Mod: 26 | Performed by: OBSTETRICS & GYNECOLOGY

## 2017-10-20 PROCEDURE — 25000125 ZZHC RX 250: Performed by: NURSE ANESTHETIST, CERTIFIED REGISTERED

## 2017-10-20 PROCEDURE — 27210995 ZZH RX 272: Performed by: OBSTETRICS & GYNECOLOGY

## 2017-10-20 PROCEDURE — 36000051 ZZH SURGERY LEVEL 2 1ST 30 MIN - UMMC: Performed by: OBSTETRICS & GYNECOLOGY

## 2017-10-20 PROCEDURE — 00000093 ZZHCL STATISTIC COURTESY CONSULT: Performed by: OBSTETRICS & GYNECOLOGY

## 2017-10-20 PROCEDURE — 86900 BLOOD TYPING SEROLOGIC ABO: CPT | Performed by: OBSTETRICS & GYNECOLOGY

## 2017-10-20 PROCEDURE — 37000008 ZZH ANESTHESIA TECHNICAL FEE, 1ST 30 MIN: Performed by: OBSTETRICS & GYNECOLOGY

## 2017-10-20 PROCEDURE — 00000159 ZZHCL STATISTIC H-SEND OUTS PREP: Performed by: OBSTETRICS & GYNECOLOGY

## 2017-10-20 PROCEDURE — 88305 TISSUE EXAM BY PATHOLOGIST: CPT | Performed by: OBSTETRICS & GYNECOLOGY

## 2017-10-20 RX ORDER — KETOROLAC TROMETHAMINE 30 MG/ML
INJECTION, SOLUTION INTRAMUSCULAR; INTRAVENOUS PRN
Status: DISCONTINUED | OUTPATIENT
Start: 2017-10-20 | End: 2017-10-20

## 2017-10-20 RX ORDER — SODIUM CHLORIDE, SODIUM LACTATE, POTASSIUM CHLORIDE, CALCIUM CHLORIDE 600; 310; 30; 20 MG/100ML; MG/100ML; MG/100ML; MG/100ML
INJECTION, SOLUTION INTRAVENOUS CONTINUOUS PRN
Status: DISCONTINUED | OUTPATIENT
Start: 2017-10-20 | End: 2017-10-20

## 2017-10-20 RX ORDER — ONDANSETRON 2 MG/ML
4 INJECTION INTRAMUSCULAR; INTRAVENOUS EVERY 30 MIN PRN
Status: CANCELLED | OUTPATIENT
Start: 2017-10-20

## 2017-10-20 RX ORDER — SODIUM CHLORIDE, SODIUM LACTATE, POTASSIUM CHLORIDE, CALCIUM CHLORIDE 600; 310; 30; 20 MG/100ML; MG/100ML; MG/100ML; MG/100ML
INJECTION, SOLUTION INTRAVENOUS CONTINUOUS
Status: DISCONTINUED | OUTPATIENT
Start: 2017-10-20 | End: 2017-10-20 | Stop reason: HOSPADM

## 2017-10-20 RX ORDER — PROPOFOL 10 MG/ML
INJECTION, EMULSION INTRAVENOUS PRN
Status: DISCONTINUED | OUTPATIENT
Start: 2017-10-20 | End: 2017-10-20

## 2017-10-20 RX ORDER — HYDROMORPHONE HYDROCHLORIDE 1 MG/ML
.3-.5 INJECTION, SOLUTION INTRAMUSCULAR; INTRAVENOUS; SUBCUTANEOUS EVERY 10 MIN PRN
Status: CANCELLED | OUTPATIENT
Start: 2017-10-20

## 2017-10-20 RX ORDER — LIDOCAINE HYDROCHLORIDE 10 MG/ML
INJECTION, SOLUTION INFILTRATION; PERINEURAL PRN
Status: DISCONTINUED | OUTPATIENT
Start: 2017-10-20 | End: 2017-10-20 | Stop reason: HOSPADM

## 2017-10-20 RX ORDER — LIDOCAINE 40 MG/G
CREAM TOPICAL
Status: DISCONTINUED | OUTPATIENT
Start: 2017-10-20 | End: 2017-10-20 | Stop reason: HOSPADM

## 2017-10-20 RX ORDER — NALOXONE HYDROCHLORIDE 0.4 MG/ML
.1-.4 INJECTION, SOLUTION INTRAMUSCULAR; INTRAVENOUS; SUBCUTANEOUS
Status: DISCONTINUED | OUTPATIENT
Start: 2017-10-20 | End: 2017-10-20 | Stop reason: HOSPADM

## 2017-10-20 RX ORDER — SODIUM CHLORIDE, SODIUM LACTATE, POTASSIUM CHLORIDE, CALCIUM CHLORIDE 600; 310; 30; 20 MG/100ML; MG/100ML; MG/100ML; MG/100ML
INJECTION, SOLUTION INTRAVENOUS CONTINUOUS
Status: CANCELLED | OUTPATIENT
Start: 2017-10-20

## 2017-10-20 RX ORDER — ONDANSETRON 4 MG/1
4 TABLET, ORALLY DISINTEGRATING ORAL EVERY 30 MIN PRN
Status: CANCELLED | OUTPATIENT
Start: 2017-10-20

## 2017-10-20 RX ORDER — OXYCODONE HYDROCHLORIDE 5 MG/1
5-10 TABLET ORAL
Status: DISCONTINUED | OUTPATIENT
Start: 2017-10-20 | End: 2017-10-20 | Stop reason: HOSPADM

## 2017-10-20 RX ORDER — IBUPROFEN 600 MG/1
600 TABLET, FILM COATED ORAL
Status: DISCONTINUED | OUTPATIENT
Start: 2017-10-20 | End: 2017-10-20 | Stop reason: HOSPADM

## 2017-10-20 RX ORDER — PROPOFOL 10 MG/ML
INJECTION, EMULSION INTRAVENOUS CONTINUOUS PRN
Status: DISCONTINUED | OUTPATIENT
Start: 2017-10-20 | End: 2017-10-20

## 2017-10-20 RX ORDER — DEXAMETHASONE SODIUM PHOSPHATE 10 MG/ML
INJECTION, SOLUTION INTRAMUSCULAR; INTRAVENOUS PRN
Status: DISCONTINUED | OUTPATIENT
Start: 2017-10-20 | End: 2017-10-20

## 2017-10-20 RX ORDER — ONDANSETRON 2 MG/ML
INJECTION INTRAMUSCULAR; INTRAVENOUS PRN
Status: DISCONTINUED | OUTPATIENT
Start: 2017-10-20 | End: 2017-10-20

## 2017-10-20 RX ORDER — FENTANYL CITRATE 50 UG/ML
INJECTION, SOLUTION INTRAMUSCULAR; INTRAVENOUS PRN
Status: DISCONTINUED | OUTPATIENT
Start: 2017-10-20 | End: 2017-10-20

## 2017-10-20 RX ORDER — MEPERIDINE HYDROCHLORIDE 25 MG/ML
12.5 INJECTION INTRAMUSCULAR; INTRAVENOUS; SUBCUTANEOUS
Status: CANCELLED | OUTPATIENT
Start: 2017-10-20

## 2017-10-20 RX ORDER — DOXYCYCLINE 100 MG/10ML
100 INJECTION, POWDER, LYOPHILIZED, FOR SOLUTION INTRAVENOUS
Status: COMPLETED | OUTPATIENT
Start: 2017-10-20 | End: 2017-10-20

## 2017-10-20 RX ORDER — FENTANYL CITRATE 50 UG/ML
25-50 INJECTION, SOLUTION INTRAMUSCULAR; INTRAVENOUS
Status: CANCELLED | OUTPATIENT
Start: 2017-10-20

## 2017-10-20 RX ORDER — NALOXONE HYDROCHLORIDE 0.4 MG/ML
.1-.4 INJECTION, SOLUTION INTRAMUSCULAR; INTRAVENOUS; SUBCUTANEOUS
Status: CANCELLED | OUTPATIENT
Start: 2017-10-20 | End: 2017-10-21

## 2017-10-20 RX ORDER — LIDOCAINE HYDROCHLORIDE 20 MG/ML
INJECTION, SOLUTION INFILTRATION; PERINEURAL PRN
Status: DISCONTINUED | OUTPATIENT
Start: 2017-10-20 | End: 2017-10-20

## 2017-10-20 RX ORDER — MAGNESIUM HYDROXIDE 1200 MG/15ML
LIQUID ORAL PRN
Status: DISCONTINUED | OUTPATIENT
Start: 2017-10-20 | End: 2017-10-20 | Stop reason: HOSPADM

## 2017-10-20 RX ADMIN — ONDANSETRON 4 MG: 2 INJECTION INTRAMUSCULAR; INTRAVENOUS at 09:07

## 2017-10-20 RX ADMIN — MIDAZOLAM HYDROCHLORIDE 2 MG: 1 INJECTION, SOLUTION INTRAMUSCULAR; INTRAVENOUS at 09:02

## 2017-10-20 RX ADMIN — PROPOFOL 100 MCG/KG/MIN: 10 INJECTION, EMULSION INTRAVENOUS at 09:04

## 2017-10-20 RX ADMIN — FENTANYL CITRATE 50 MCG: 50 INJECTION, SOLUTION INTRAMUSCULAR; INTRAVENOUS at 09:09

## 2017-10-20 RX ADMIN — KETOROLAC TROMETHAMINE 30 MG: 30 INJECTION, SOLUTION INTRAMUSCULAR at 09:31

## 2017-10-20 RX ADMIN — PROPOFOL 50 MG: 10 INJECTION, EMULSION INTRAVENOUS at 09:05

## 2017-10-20 RX ADMIN — SODIUM CHLORIDE, POTASSIUM CHLORIDE, SODIUM LACTATE AND CALCIUM CHLORIDE: 600; 310; 30; 20 INJECTION, SOLUTION INTRAVENOUS at 08:58

## 2017-10-20 RX ADMIN — SODIUM CHLORIDE 100 ML: 900 IRRIGANT IRRIGATION at 09:23

## 2017-10-20 RX ADMIN — DEXAMETHASONE SODIUM PHOSPHATE 4 MG: 10 INJECTION, SOLUTION INTRAMUSCULAR; INTRAVENOUS at 09:30

## 2017-10-20 RX ADMIN — HYDROMORPHONE HYDROCHLORIDE 0.5 MG: 1 INJECTION, SOLUTION INTRAMUSCULAR; INTRAVENOUS; SUBCUTANEOUS at 09:28

## 2017-10-20 RX ADMIN — LIDOCAINE HYDROCHLORIDE 80 MG: 20 INJECTION, SOLUTION INFILTRATION; PERINEURAL at 09:04

## 2017-10-20 RX ADMIN — SILVER NITRATE APPLICATORS 1 APPLICATOR: 25; 75 STICK TOPICAL at 09:28

## 2017-10-20 RX ADMIN — PROPOFOL 40 MG: 10 INJECTION, EMULSION INTRAVENOUS at 09:09

## 2017-10-20 RX ADMIN — FENTANYL CITRATE 50 MCG: 50 INJECTION, SOLUTION INTRAMUSCULAR; INTRAVENOUS at 09:15

## 2017-10-20 RX ADMIN — DOXYCYCLINE 100 MG: 100 INJECTION, POWDER, LYOPHILIZED, FOR SOLUTION INTRAVENOUS at 09:02

## 2017-10-20 RX ADMIN — LIDOCAINE HYDROCHLORIDE 10 ML: 10 INJECTION, SOLUTION INFILTRATION; PERINEURAL at 09:19

## 2017-10-20 NOTE — DISCHARGE INSTRUCTIONS
Same-Day Surgery   Adult Discharge Orders & Instructions     For 24 hours after surgery:  1. Get plenty of rest.  A responsible adult must stay with you for at least 24 hours after you leave the hospital.   2. Pain medication can slow your reflexes. Do not drive or use heavy equipment.  If you have weakness or tingling, don't drive or use heavy equipment until this feeling goes away.  3. Mixing alcohol and pain medication can cause dizziness and slow your breathing. It can even be fatal. Do not drink alcohol while taking pain medication.  4. Avoid strenuous or risky activities.  Ask for help when climbing stairs.   5. You may feel lightheaded.  If so, sit for a few minutes before standing.  Have someone help you get up.   6. If you have nausea (feel sick to your stomach), drink only clear liquids such as apple juice, ginger ale, broth or 7-Up.  Rest may also help.  Be sure to drink enough fluids.  Move to a regular diet as you feel able. Take pain medications with a small amount of solid food, such as toast or crackers, to avoid nausea.   7. A slight fever is normal. Call the doctor if your fever is over 100 F (37.7 C) (taken under the tongue) or lasts longer than 24 hours.  8. You may have a dry mouth, muscle aches, trouble sleeping or a sore throat.  These symptoms should go away after 24 hours.  9. Do not make important or legal decisions.   Pain Management:      1. Take pain medication (if prescribed) for pain as directed by your physician.        2. WARNING: If the pain medication you have been prescribed contains Tylenol  (acetaminophen), DO NOT take additional doses of Tylenol (acetaminophen).     Call your doctor for any of the followin.  Signs of infection (fever, growing tenderness at the surgery site, severe pain, a large amount of drainage or bleeding, foul-smelling drainage, redness, swelling).    2.  It has been over 8 to 10 hours since surgery and you are still not able to urinate (pee).    3.   Headache for over 24 hours.    4.  Numbness, tingling or weakness the day after surgery (if you had spinal anesthesia).  To contact a doctor, call _____________________________________ or:      [phone] and ask for the Resident On Call for:          __________________________________________ (answered 24 hours a day)      Emergency Department:  [place] [facility]: [phone]  [place] [facility]: [phone]               Rev. 10/2014   Discharge Instructions: Following a Dilation   and Curettage/Dilation and Evacuation    What to expect:    Expect small to moderate amount of vaginal bleeding which should taper off in 4-5 days. It should not be heavier than your regular menstrual flow.    Do not douche, and use a pad rather than tampons.     No intercourse until bleeding has ceased.    Activity:    Rest the day of surgery. You may resume normal activity the next day.    You may bathe or shower.    Avoid heavy lifting (10-15 lbs) for one week.    Comfort:    The amount of discomfort you can expect is very unpredictable. If you have pain that cannot be controlled with non-aspirin pain relievers or with the prescription you may have received, you should notify your doctor.    Abdominal cramping (like menstrual cramps) or low back ache are common and should not be a cause for concern. You will be drowsy and weak the day of surgery and possibly the following day.    Diet:    You have no restrictions on your diet. Following surgery, drink plenty of fluids and eat a light meal.    Nausea:    The anesthesia medications you received during your surgical procedure may produce some nausea.    If you feel nauseated, stay in bed, keep your head down and try drinking fluids such as Seven-Up, tea or soup.    Notify Physician at once if you experience:    A fever over 100 degrees (a low grade fever under 100 degrees is usual after surgery).    Heavy flow and/or passing large clots. Saturating more  than 1 pad per hour for 2 or more hours.     Severe pain or cramps.  Rev. 5/12

## 2017-10-20 NOTE — OP NOTE
[facility] Gynecology Operative Note    Date of Surgery: 10/20/2017  Patient: Aspen Gonzalez  MRN: [id number]    Preoperative diagnosis: missed  at 5 weeks 6 days gestation  Post-operative diagnosis: same   Procedure: Dilation and Suction Curettage using manual vacuum aspirator   Surgeon: Kathrin Donnelly MD  Assistant: Amy Schumer, PGY1  Anabella Garrett, PGY4    Anesthesia: MAC  Findings: anteverted uterus, nulliparous cervix, normal external genitalia and vagina. gestional sac consistent with 6-7 weeks gestation    Indications: Ms. Aspen Gonzalez is a 32 year old  who presented with ultrasound with no fetal heart rate and CRL 5.6 mm and 2 week follow up ultrasound still with no cardiac activity, therefore diagnosed as a missed .  She was not experiencing any bleeding or cramping and wished to move forward from this miscarriage as quickly as possible. Secondary to this, she was counseled on management options and desired to proceed with dilation and curettage.     Procedure: The patient was taken to the OR where MAC anesthesia was administered without difficulty. She was placed in the dorsal lithotomy position with Yellow fin stirrups. Exam under anesthesia was performed. The patient was prepped and draped in usual sterile fashion.    A speculum was introduced into the vagina and used to visualize the cervix. A single-toothed tenaculum was used to grasp the anterior lip of the cervix. A paracervical block was performed with 5 cc 1% lidocaine plain injected at the four and eight o'clock positions of the cervico-vaginal junction. Zavala dilators were then used to dilate the cervix to 21 [place]. A gentle curettage was then performed using a manual vacuum aspirator with adequate tissue obtained. Two additional passes were made until a gritty texture was noted to the entire uterine cavity. The tissue was examined and a gestational sac was identified intraoperatively.   The products of conception were sent to pathology. Instruments were then removed, and silver nitrate was applied to the anterior lip of the cervix allowing for hemostasis at the tenaculum sites.    The patient tolerated the procedure well. The instrument and sponge counts were correct x 2. The patient went to the recovery room in stable condition.     Dr. Donnelly was present for the entire procedure.    Amy Schumer, MD  OB/GYN Resident PGY-1  10/20/2017 2:02 PM    I was present during the entire procedure detailed above.     Kathrin Donnelly MD

## 2017-10-20 NOTE — BRIEF OP NOTE
[facility]  Brief Operative Note    Date of Surgery: 10/20/2017    Preop Dx:  Missed      Postop Dx:   Missed , resolved    Procedure:   Suction dilation and curettage    Surgeon:   Kathrin Donnelly MD    Assistants:   Amy Schumer, MD PGY-1     Anabella Garrett MD, PGY-4    Anesthesia:  MAC    EBL:  5 cc    IVF:  800 cc    UOP:  none    Drains: none     Specimen:  Endometrial curettings     Complications: None apparent     Findings: anteverted uterus, gestational sac approximately 7 week gestation    Disposition:  Transferred in stable condition to the PACU    Amy Schumer, MD   Obstetrics and Gynecology PGY-1  10/20/2017, 8:43 AM

## 2017-10-20 NOTE — ANESTHESIA POSTPROCEDURE EVALUATION
Patient: Aspen Gonzalez    Procedure(s):  Suction Dilation and Curettage  - Wound Class: II-Clean Contaminated    Diagnosis:Missed    Diagnosis Additional Information: No value filed.    Anesthesia Type:  No value filed.    Note:  Anesthesia Post Evaluation    Patient location during evaluation: PACU  Patient participation: Able to fully participate in evaluation  Level of consciousness: awake and alert  Pain management: adequate  Airway patency: patent  Cardiovascular status: acceptable  Respiratory status: acceptable  Hydration status: acceptable  PONV: none             Last vitals:  Vitals:    10/20/17 0945 10/20/17 1000 10/20/17 1015   BP: 124/74 111/72 104/67   Resp: 16 16    Temp:      SpO2: 97% 97% 96%         Electronically Signed By: Leesa Millard MD  2017  11:05 AM

## 2017-10-20 NOTE — ANESTHESIA CARE TRANSFER NOTE
Patient: Aspen Gonzalez    Procedure(s):  Suction Dilation and Curettage  - Wound Class: II-Clean Contaminated    Diagnosis: Missed    Diagnosis Additional Information: No value filed.    Anesthesia Type:   No value filed.     Note:  Airway :Room Air  Patient transferred to:Phase II  Comments: Report to sandee Marvin RN  Pt awake and talking.  Denies pain or nausea. (nose itching)  Temp 37.2  114/72  90 SR  RR 16, clear, SaO2 97 % RA  Handoff Report: Identifed the Patient, Identified the Reponsible Provider, Reviewed the pertinent medical history, Discussed the surgical course, Reviewed Intra-OP anesthesia mangement and issues during anesthesia, Set expectations for post-procedure period and Allowed opportunity for questions and acknowledgement of understanding      Vitals: (Last set prior to Anesthesia Care Transfer)    CRNA VITALS  10/20/2017 0906 - 10/20/2017 0958      10/20/2017             NIBP: 114/72    Ht Rate: 90                Electronically Signed By: MAXIMILIANO Hardin CRNA  2017  9:58 AM

## 2017-10-20 NOTE — ANESTHESIA PREPROCEDURE EVALUATION
33 yo F with missed  presenting for d and c  Anesthesia Evaluation     .             ROS/MED HX    ENT/Pulmonary:  - neg pulmonary ROS     Neurologic:  - neg neurologic ROS     Cardiovascular:  - neg cardiovascular ROS       METS/Exercise Tolerance:  >4 METS   Hematologic:         Musculoskeletal:         GI/Hepatic:  - neg GI/hepatic ROS       Renal/Genitourinary:  - ROS Renal section negative       Endo:  - neg endo ROS       Psychiatric:  - neg psychiatric ROS       Infectious Disease:         Malignancy:         Other:    (+) Possibly pregnant                                   Anesthesia Plan      History & Physical Review      ASA Status:  2 .    NPO Status:  > 8 hours    Plan for MAC with Intravenous induction. Maintenance will be TIVA.    PONV prophylaxis:  Ondansetron (or other 5HT-3) and Dexamethasone or Solumedrol  Native airway with propofol infusion.      Postoperative Care  Postoperative pain management:  Multi-modal analgesia.      Consents  Anesthetic plan, risks, benefits and alternatives discussed with:  Patient and Spouse..                          .

## 2017-10-20 NOTE — IP AVS SNAPSHOT
MAIN OR    [address]    [facility] [address]    Phone:  [phone]                                       After Visit Summary   10/20/2017    Aspen Gonzalez    MRN: [id number]           After Visit Summary Signature Page     I have received my discharge instructions, and my questions have been answered. I have discussed any challenges I see with this plan with the nurse or doctor.    ..........................................................................................................................................  Patient/Patient Representative Signature      ..........................................................................................................................................  Patient Representative Print Name and Relationship to Patient    ..................................................               ................................................  Date                                            Time    ..........................................................................................................................................  Reviewed by Signature/Title    ...................................................              ..............................................  Date                                                            Time

## 2017-10-20 NOTE — IP AVS SNAPSHOT
MRN:[id number]                      After Visit Summary   10/20/2017    Aspen Gonzalez    MRN: [id number]           Thank you!     Thank you for choosing [facility] for your care. Our goal is always to provide you with excellent care. Hearing back from our patients is one way we can continue to improve our services. Please take a few minutes to complete the written survey that you may receive in the mail after you visit with us. Thank you!        Patient Information     Date Of Birth          1985        About your hospital stay     You were admitted on:  October 20, 2017 You last received care in the:  [facility]    You were discharged on:  October 20, 2017       Who to Call     For medical emergencies, please call 911.  For non-urgent questions about your medical care, please call your primary care provider or clinic, [phone]  For questions related to your surgery, please call your surgery clinic        Attending Provider     Provider Specialty    Kathrin Donnelly MD OB/Gyn       Primary Care Provider Office Phone # Fax #    Maikel Apple [phone]      After Care Instructions     Discharge Instructions       Resume pre procedure diet            Discharge Instructions       Pelvic Rest. No tampons, douching or intercourse for  2  weeks.            Discharge Instructions       Patient to arrange follow up appointment in approximately 2  weeks            Ice to affected area       PRN as tolerated            No alcohol       NO ALCOHOL for 24 hours post procedure            No driving or operating machinery       No driving or operating machinery until day after procedure            Shower        May shower day of surgery.                  Your next 10 appointments already scheduled     Nov 02, 2017  1:15 PM CDT   Return Visit with Marlee Baugh MD   [facility] ([facility])    [facility]  [address]  [address]  [facility] [id number] [phone]              Further instructions from your care team       Same-Day Surgery   Adult Discharge Orders & Instructions     For 24 hours after surgery:  1. Get plenty of rest.  A responsible adult must stay with you for at least 24 hours after you leave the hospital.   2. Pain medication can slow your reflexes. Do not drive or use heavy equipment.  If you have weakness or tingling, don't drive or use heavy equipment until this feeling goes away.  3. Mixing alcohol and pain medication can cause dizziness and slow your breathing. It can even be fatal. Do not drink alcohol while taking pain medication.  4. Avoid strenuous or risky activities.  Ask for help when climbing stairs.   5. You may feel lightheaded.  If so, sit for a few minutes before standing.  Have someone help you get up.   6. If you have nausea (feel sick to your stomach), drink only clear liquids such as apple juice, ginger ale, broth or 7-Up.  Rest may also help.  Be sure to drink enough fluids.  Move to a regular diet as you feel able. Take pain medications with a small amount of solid food, such as toast or crackers, to avoid nausea.   7. A slight fever is normal. Call the doctor if your fever is over 100 F (37.7 C) (taken under the tongue) or lasts longer than 24 hours.  8. You may have a dry mouth, muscle aches, trouble sleeping or a sore throat.  These symptoms should go away after 24 hours.  9. Do not make important or legal decisions.   Pain Management:      1. Take pain medication (if prescribed) for pain as directed by your physician.        2. WARNING: If the pain medication you have been prescribed contains Tylenol  (acetaminophen), DO NOT take additional doses of Tylenol (acetaminophen).     Call your doctor for any of the followin.  Signs of infection (fever, growing tenderness at the surgery site, severe pain, a large amount of drainage or bleeding,  foul-smelling drainage, redness, swelling).    2.  It has been over 8 to 10 hours since surgery and you are still not able to urinate (pee).    3.  Headache for over 24 hours.    4.  Numbness, tingling or weakness the day after surgery (if you had spinal anesthesia).  To contact a doctor, call _____________________________________ or:      [phone] and ask for the Resident On Call for:          __________________________________________ (answered 24 hours a day)      Emergency Department:  [facility]: [phone]  [facility]: [phone]               Rev. 10/2014   Discharge Instructions: Following a Dilation   and Curettage/Dilation and Evacuation    What to expect:    Expect small to moderate amount of vaginal bleeding which should taper off in 4-5 days. It should not be heavier than your regular menstrual flow.    Do not douche, and use a pad rather than tampons.     No intercourse until bleeding has ceased.    Activity:    Rest the day of surgery. You may resume normal activity the next day.    You may bathe or shower.    Avoid heavy lifting (10-15 lbs) for one week.    Comfort:    The amount of discomfort you can expect is very unpredictable. If you have pain that cannot be controlled with non-aspirin pain relievers or with the prescription you may have received, you should notify your doctor.    Abdominal cramping (like menstrual cramps) or low back ache are common and should not be a cause for concern. You will be drowsy and weak the day of surgery and possibly the following day.    Diet:    You have no restrictions on your diet. Following surgery, drink plenty of fluids and eat a light meal.    Nausea:    The anesthesia medications you received during your surgical procedure may produce some nausea.    If you feel nauseated, stay in bed, keep your head down and try drinking fluids such as Seven-Up, tea or soup.    Notify Physician at once if you  "experience:    A fever over 100 degrees (a low grade fever under 100 degrees is usual after surgery).    Heavy flow and/or passing large clots. Saturating more than 1 pad per hour for 2 or more hours.     Severe pain or cramps.  Rev. 5/12          Pending Results     No orders found from 10/18/2017 to 10/21/2017.            Admission Information     Date & Time Provider Department Dept. Phone    10/20/2017 Kathrin Donnelly MD  MAIN [phone]      Your Vitals Were     Blood Pressure Temperature Respirations Height Weight Pulse Oximetry    117/81 98.1  F (36.7  C) (Oral) 14 1.626 m (5' 4\") 74 kg (163 lb 2.3 oz) 100%    BMI (Body Mass Index)                   28 kg/m2           [employer] Information     [facility] gives you secure access to your electronic health record. If you see a primary care provider, you can also send messages to your care team and make appointments. If you have questions, please call your primary care clinic.  If you do not have a primary care provider, please call [phone] and they will assist you.        Care EveryWhere ID     This is your Care EveryWhere ID. This could be used by other organizations to access your [facility] medical records  [id number]        Equal Access to Services     [facility]ER[facility] AH: Hadlilia carriono Soana, waaxda luqadaha, qaybta kaalmada adeegyada, crissy corbin. So [facility] [phone].    ATENCIÓN: Si habla español, tiene a shafer disposición servicios gratuitos de asistencia lingüística. Llame al [phone].    We comply with applicable federal civil rights laws and [place] laws. We do not discriminate on the basis of race, color, national origin, age, disability, sex, sexual orientation, or gender identity.               Review of your medicines      CONTINUE these medicines which have NOT CHANGED        Dose / Directions    prenatal multivitamin plus iron 27-0.8 MG Tabs per tablet        Dose:  1 tablet   Take 1 tablet by " mouth daily   Refills:  0                Protect others around you: Learn how to safely use, store and throw away your medicines at www.disposemymeds.org.             Medication List: This is a list of all your medications and when to take them. Check marks below indicate your daily home schedule. Keep this list as a reference.      Medications           Morning Afternoon Evening Bedtime As Needed    prenatal multivitamin plus iron 27-0.8 MG Tabs per tablet   Take 1 tablet by mouth daily

## 2017-10-31 LAB — COPATH REPORT: NORMAL

## 2017-11-01 ENCOUNTER — OFFICE VISIT (OUTPATIENT)
Dept: OBGYN | Facility: CLINIC | Age: 32
End: 2017-11-01
Attending: OBSTETRICS & GYNECOLOGY
Payer: COMMERCIAL

## 2017-11-01 VITALS
SYSTOLIC BLOOD PRESSURE: 119 MMHG | HEART RATE: 81 BPM | HEIGHT: 64 IN | DIASTOLIC BLOOD PRESSURE: 75 MMHG | WEIGHT: 166.1 LBS | BODY MASS INDEX: 28.36 KG/M2

## 2017-11-01 DIAGNOSIS — O02.1 MISSED ABORTIONS: ICD-10-CM

## 2017-11-01 DIAGNOSIS — Q51.9 ANOMALY OF UTERUS: Primary | ICD-10-CM

## 2017-11-01 LAB
HCG UR QL: NEGATIVE
INTERNAL QC OK POCT: YES

## 2017-11-01 PROCEDURE — 99212 OFFICE O/P EST SF 10 MIN: CPT | Mod: ZF

## 2017-11-01 PROCEDURE — 81025 URINE PREGNANCY TEST: CPT | Mod: ZF | Performed by: OBSTETRICS & GYNECOLOGY

## 2017-11-01 ASSESSMENT — PAIN SCALES - GENERAL: PAINLEVEL: NO PAIN (0)

## 2017-11-01 NOTE — PROGRESS NOTES
"CC:  Post-op evaluation    Subjective:    Caolfionn \"Dallin\" Sarah Gonzalez is a 32 year old  who is 12 days s/p D&C for a missed  in the first trimester.       She states that she is feeling well today after her procedure, and denies any vaginal bleeding or abdominal discomfort since.  This was a planned first pregnancy, and she expresses sadness at the loss.  However, she and her  and looking forward to trying again soon.  She is somewhat concerned regarding the \"possible septate uterus\" that was seen on her dating US, and would like that to be rechecked prior to trying again.  Denies any f/c or n/v.      Objective:   /75 (BP Location: Left arm, Patient Position: Chair)  Pulse 81  Ht 1.626 m (5' 4\")  Wt 75.3 kg (166 lb 1.6 oz)  Breastfeeding? No  BMI 28.51 kg/m2  General:  Pleasant, well-groomed.    Pulmonary:  No acute distress.   Psych:  Denies any depression.  Has family support for recent fetal loss.    UPT - negative today    Assessment and Plan:  Caolfionn \"Dallin\" Sarah Gonzalez is a 32 year old  who is 12 days s/p D&C for a missed  in the first trimester doing well.      1.  Missed  s/p D&C:  Patient doing well without any complications or residual vaginal bleeding.  She and  are going to try for another baby in the future.  On her most recent US, it was noted that she had a septate uterus; however this was not mentioned in the one prior to.  - Saline infusion sonohysterography (SIS)  - Follow up thereafter     I, Jessica Stanton MS3, am acting as the scribe for Hattie White MD.  All aspects of the exam and documentation were approved by the attending physician.    Jessica Stanton MS3    The above patient was seen and evaluated with the medical student who acted as my scribe for the above note. Agree with note, changes made as appropriate.    Hattie White MD    "

## 2017-11-01 NOTE — LETTER
"2017       RE: Aspen Gonzalez  [address] [facility] [address]     Dear Colleague,    Thank you for referring your patient, Aspen Gonzalez, to the [facility] at [facility]. Please see a copy of my visit note below.    CC:  Post-op evaluation    Subjective:    Aspen \"Dallin\" Sarah Gonzalez is a 32 year old  who is 12 days s/p D&C for a missed  in the first trimester.       She states that she is feeling well today after her procedure, and denies any vaginal bleeding or abdominal discomfort since.  This was a planned first pregnancy, and she expresses sadness at the loss.  However, she and her  and looking forward to trying again soon.  She is somewhat concerned regarding the \"possible septate uterus\" that was seen on her dating US, and would like that to be rechecked prior to trying again.  Denies any f/c or n/v.      Objective:   /75 (BP Location: Left arm, Patient Position: Chair)  Pulse 81  Ht 1.626 m (5' 4\")  Wt 75.3 kg (166 lb 1.6 oz)  Breastfeeding? No  BMI 28.51 kg/m2  General:  Pleasant, well-groomed.    Pulmonary:  No acute distress.   Psych:  Denies any depression.  Has family support for recent fetal loss.    UPT - negative today    Assessment and Plan:  Aspen \"Dallin\" Sarah Gonzalez is a 32 year old  who is 12 days s/p D&C for a missed  in the first trimester doing well.      1.  Missed  s/p D&C:  Patient doing well without any complications or residual vaginal bleeding.  She and  are going to try for another baby in the future.  On her most recent US, it was noted that she had a septate uterus; however this was not mentioned in the one prior to.  - Saline infusion sonohysterography (SIS)  - Follow up thereafter     Jessica CUNNINGHAM MS3, am acting as the scribe for Hattie White MD.  All aspects of the exam and documentation " were approved by the attending physician.    Jessica Stanton MS3    The above patient was seen and evaluated with the medical student who acted as my scribe for the above note. Agree with note, changes made as appropriate.      Again, thank you for allowing me to participate in the care of your patient.      Sincerely,    Hattie White MD

## 2017-11-01 NOTE — MR AVS SNAPSHOT
After Visit Summary   11/1/2017    Aspen Gonzalez    MRN: [id number]           Patient Information     Date Of Birth          1985        Visit Information        Provider Department      11/1/2017 2:45 PM Hattie White MD [facility]        Today's Diagnoses     Anomaly of uterus    -  1    Missed abortions           Follow-ups after your visit        Your next 10 appointments already scheduled     Nov 20, 2017  1:30 PM CST   Sonohysterogram with Shalini Nichole MD, [facility] ULTRASOUND   [facility] ([facility])    [facility] [address]  [address]  [facility] [address] [phone]              Future tests that were ordered for you today     Open Future Orders        Priority Expected Expires Ordered    US Hysterosonography (In Clinic) Routine  3/1/2018 11/1/2017            Who to contact     Please call your clinic at [phone] to:    Ask questions about your health    Make or cancel appointments    Discuss your medicines    Learn about your test results    Speak to your doctor   If you have compliments or concerns about an experience at your clinic, or if you wish to file a complaint, please contact [facility] Patient Relations at [phone] or email us at [email or web address]         Additional Information About Your Visit        MyChart Information     [employer] gives you secure access to your electronic health record. If you see a primary care provider, you can also send messages to your care team and make appointments. If you have questions, please call your primary care clinic.  If you do not have a primary care provider, please call [phone] and they will assist you.      [employer] is an electronic gateway that provides easy, online access to your medical records. With [employer], you can request a clinic appointment, read your test  "results, renew a prescription or communicate with your care team.     To access your existing account, please contact your [facility] or call [phone] for assistance.        Care EveryWhere ID     This is your Care EveryWhere ID. This could be used by other organizations to access your [facility] medical records  [id number]        Your Vitals Were     Pulse Height Breastfeeding? BMI (Body Mass Index)          81 1.626 m (5' 4\") No 28.51 kg/m2         Blood Pressure from Last 3 Encounters:   11/01/17 119/75   10/20/17 104/67   10/16/17 126/78    Weight from Last 3 Encounters:   11/01/17 75.3 kg (166 lb 1.6 oz)   10/20/17 74 kg (163 lb 2.3 oz)   10/16/17 73.9 kg (162 lb 14.4 oz)              We Performed the Following     hCG qual urine POCT        Primary Care Provider Office Phone # Fax #    Maikel Apple [phone]       [facility] [address]   [facility] [address]        Equal Access to Services     [facility]: Hadii aad ku hadasho Soomaali, waaxda luqadaha, qaybta kaalmada curtis, crissy ruiz . So [facility] [phone].    ATENCIÓN: Si habla español, tiene a shafer disposición servicios gratuitos de asistencia lingüística. Horacio al [phone].    We comply with applicable federal civil rights laws and [place] laws. We do not discriminate on the basis of race, color, national origin, age, disability, sex, sexual orientation, or gender identity.            Thank you!     Thank you for choosing [facility]  for your care. Our goal is always to provide you with excellent care. Hearing back from our patients is one way we can continue to improve our services. Please take a few minutes to complete the written survey that you may receive in the mail after your visit with us. Thank you!             Your Updated Medication List - Protect others around you: Learn how to safely " use, store and throw away your medicines at www.disposemymeds.org.      Notice  As of 11/1/2017  5:10 PM    You have not been prescribed any medications.

## 2017-11-01 NOTE — NURSING NOTE
Chief Complaint   Patient presents with     Post-op Visit     Post Op D&C       See KARLA Rizvi 11/1/2017

## 2017-11-20 ENCOUNTER — OFFICE VISIT (OUTPATIENT)
Dept: OBGYN | Facility: CLINIC | Age: 32
End: 2017-11-20
Attending: OBSTETRICS & GYNECOLOGY
Payer: COMMERCIAL

## 2017-11-20 VITALS
WEIGHT: 167 LBS | BODY MASS INDEX: 28.67 KG/M2 | HEART RATE: 72 BPM | SYSTOLIC BLOOD PRESSURE: 118 MMHG | DIASTOLIC BLOOD PRESSURE: 70 MMHG

## 2017-11-20 DIAGNOSIS — Q51.9 UTERINE ANOMALY: ICD-10-CM

## 2017-11-20 DIAGNOSIS — Q51.9 ANOMALY OF UTERUS: ICD-10-CM

## 2017-11-20 DIAGNOSIS — Z01.812 PRE-PROCEDURE LAB EXAM: Primary | ICD-10-CM

## 2017-11-20 LAB
HCG UR QL: NEGATIVE
INTERNAL QC OK POCT: YES

## 2017-11-20 PROCEDURE — 99212 OFFICE O/P EST SF 10 MIN: CPT | Mod: 25,ZF

## 2017-11-20 PROCEDURE — 81025 URINE PREGNANCY TEST: CPT | Mod: ZF | Performed by: OBSTETRICS & GYNECOLOGY

## 2017-11-20 PROCEDURE — 58340 CATHETER FOR HYSTEROGRAPHY: CPT | Mod: ZF | Performed by: OBSTETRICS & GYNECOLOGY

## 2017-11-20 ASSESSMENT — PAIN SCALES - GENERAL: PAINLEVEL: NO PAIN (0)

## 2017-11-20 NOTE — MR AVS SNAPSHOT
After Visit Summary   11/20/2017    Aspen Gonzalez    MRN: [id number]           Patient Information     Date Of Birth          1985        Visit Information        Provider Department      11/20/2017 1:30 PM Shalini Nichole MD; [facility] ULTRASOUND [facility]        Today's Diagnoses     Pre-procedure lab exam    -  1    Anomaly of uterus           Follow-ups after your visit        Who to contact     Please call your clinic at [phone] to:    Ask questions about your health    Make or cancel appointments    Discuss your medicines    Learn about your test results    Speak to your doctor   If you have compliments or concerns about an experience at your clinic, or if you wish to file a complaint, please contact [facility] Patient Relations at [phone] or email us at [email or web address]         Additional Information About Your Visit        MyChart Information     [employer] gives you secure access to your electronic health record. If you see a primary care provider, you can also send messages to your care team and make appointments. If you have questions, please call your primary care clinic.  If you do not have a primary care provider, please call [phone] and they will assist you.      [employer] is an electronic gateway that provides easy, online access to your medical records. With [employer], you can request a clinic appointment, read your test results, renew a prescription or communicate with your care team.     To access your existing account, please contact your [facility] or call [phone] for assistance.        Care EveryWhere ID     This is your Care EveryWhere ID. This could be used by other organizations to access your [place] medical records  [id number]        Your Vitals Were     Pulse BMI (Body Mass Index)                72 28.67 kg/m2           Blood Pressure from  Last 3 Encounters:   11/20/17 118/70   11/01/17 119/75   10/20/17 104/67    Weight from Last 3 Encounters:   11/20/17 75.8 kg (167 lb)   11/01/17 75.3 kg (166 lb 1.6 oz)   10/20/17 74 kg (163 lb 2.3 oz)              We Performed the Following     hCG qual urine POCT     US Hysterosonography (In Clinic)        Primary Care Provider Office Phone # Fax #    Maikel Moshe Apple [phone]       [facility] [address]   [facility] [address]        Equal Access to Services     [facility] : Hadii aad rachel hadthomaso Soana, waaxda luqadaha, qaybta kaalmada adealanayada, crissy ruiz . So [facility] [phone].    ATENCIÓN: Si habla español, tiene a shafer disposición servicios gratuitos de asistencia lingüística. Ll[facility] [phone].    We comply with applicable federal civil rights laws and [place] laws. We do not discriminate on the basis of race, color, national origin, age, disability, sex, sexual orientation, or gender identity.            Thank you!     Thank you for choosing [facility]  for your care. Our goal is always to provide you with excellent care. Hearing back from our patients is one way we can continue to improve our services. Please take a few minutes to complete the written survey that you may receive in the mail after your visit with us. Thank you!             Your Updated Medication List - Protect others around you: Learn how to safely use, store and throw away your medicines at www.disposemymeds.org.      Notice  As of 11/20/2017 11:59 PM    You have not been prescribed any medications.

## 2017-11-20 NOTE — LETTER
11/20/2017       RE: Aspen Gonzalez  [address]  [place] [address]     Dear Colleague,    Thank you for referring your patient, Aspen Gonzalez, to the [facility] at [facility]. Please see a copy of my visit note below.    Pt here for SIS d/t suspected septum seen on early pregnancy u/s.  Has missed AB s/p D and C with subsequent neg UPT.     Vitals:    11/20/17 1332   BP: 118/70   Pulse: 72   Weight: 75.8 kg (167 lb)     SIS Procedure    Indications: Assess uterine cavity    Pre procedure Diagnosis: possible uterine septum  Post Procedure Diagnosis: Same and possible polyp    UPT: neg    Procedure: saline infused sonohysterogram  Anesthesia: none  EBL: 2 cc  Total Saline: 60 cc    Procedure: With her permission, after explaining the procedure, the patient was placed in the dorsal lithotomy position.  A medium Graves speculum was inserted.  The cervix was visualized and cleansed with betadine solution x 3.   A single tooth tenaculum was placed at 12 o' clock. The catheter was inserted to the internal os.      SIS was completed with findings of likely partial uterine septum as well as probable endometrial polyp.     All instruments were removed from the cervix.   Hemostasis attained with pressure .     Shalini Nichole MD, FACOG  [facility]  OB/GYN    11/20/2017    TT spent 20 min, >50% of time spent counseling and coordinating care      Again, thank you for allowing me to participate in the care of your patient.      Sincerely,    Shalini Nichole MD

## 2017-11-21 NOTE — PROGRESS NOTES
Pt here for SIS d/t suspected septum seen on early pregnancy u/s.  Has missed AB s/p D and C with subsequent neg UPT.     Vitals:    11/20/17 1332   BP: 118/70   Pulse: 72   Weight: 75.8 kg (167 lb)     SIS Procedure    Indications: Assess uterine cavity    Pre procedure Diagnosis: possible uterine septum  Post Procedure Diagnosis: Same and possible polyp    UPT: neg    Procedure: saline infused sonohysterogram  Anesthesia: none  EBL: 2 cc  Total Saline: 60 cc    Procedure: With her permission, after explaining the procedure, the patient was placed in the dorsal lithotomy position.  A medium Graves speculum was inserted.  The cervix was visualized and cleansed with betadine solution x 3.   A single tooth tenaculum was placed at 12 o' clock. The catheter was inserted to the internal os.      SIS was completed with findings of likely partial uterine septum as well as probable endometrial polyp.     All instruments were removed from the cervix.   Hemostasis attained with pressure .     Shalini Nichole MD, FACOG  [facility]  OB/GYN    11/20/2017    TT spent 20 min, >50% of time spent counseling and coordinating care

## 2017-11-27 ENCOUNTER — TELEPHONE (OUTPATIENT)
Dept: OBGYN | Facility: CLINIC | Age: 32
End: 2017-11-27

## 2017-11-29 ENCOUNTER — TELEPHONE (OUTPATIENT)
Dept: OBGYN | Facility: CLINIC | Age: 32
End: 2017-11-29

## 2017-11-29 LAB
CASE NUMBER: NORMAL
LOCATION PERFORMED: NORMAL
RESULT: NORMAL
SEND OUTS MISC TEST SPECIMEN: NORMAL
TEST NAME: NORMAL

## 2017-11-29 NOTE — TELEPHONE ENCOUNTER
Confirmed surgery date with patient 12/15/17 with arrival time at 9:00a.m with nothing to eat eight hours before scheduled surgery time and clear liquids up to two hours before scheduled surgery time, informed patient that she must have a pre op physical before surgery and that a map and letter will be mailed out.     to complete the following fields:            CHECKLIST     5o9 Calendar : Yes     Resident notified: Not Applicable     Clinic schedule blocked:  Not Applicable    Patient notified:Yes      Pre op information sent: Yes     Given to patient over the phone.Yes    Comments:

## 2017-12-08 ENCOUNTER — OFFICE VISIT (OUTPATIENT)
Dept: FAMILY MEDICINE | Facility: CLINIC | Age: 32
End: 2017-12-08
Payer: COMMERCIAL

## 2017-12-08 VITALS
OXYGEN SATURATION: 99 % | BODY MASS INDEX: 28.51 KG/M2 | DIASTOLIC BLOOD PRESSURE: 78 MMHG | TEMPERATURE: 97.8 F | HEART RATE: 71 BPM | SYSTOLIC BLOOD PRESSURE: 124 MMHG | WEIGHT: 167 LBS | HEIGHT: 64 IN

## 2017-12-08 DIAGNOSIS — O02.1 MISSED ABORTION: ICD-10-CM

## 2017-12-08 DIAGNOSIS — Z01.818 PREOP GENERAL PHYSICAL EXAM: Primary | ICD-10-CM

## 2017-12-08 DIAGNOSIS — Q51.9 ANOMALY OF UTERUS: ICD-10-CM

## 2017-12-08 PROCEDURE — 99214 OFFICE O/P EST MOD 30 MIN: CPT | Performed by: FAMILY MEDICINE

## 2017-12-08 NOTE — MR AVS SNAPSHOT
After Visit Summary   12/8/2017    Aspen Gonzalez    MRN: [id number]           Patient Information     Date Of Birth          1985        Visit Information        Provider Department      12/8/2017 11:00 AM Katerin Smith MD [facility] [place]        Today's Diagnoses     Preop general physical exam    -  1    Anomaly of uterus          Care Instructions      Before Your Surgery      Call your surgeon if there is any change in your health. This includes signs of a cold or flu (such as a sore throat, runny nose, cough, rash or fever).    Do not smoke, drink alcohol or take over the counter medicine (unless your surgeon or primary care doctor tells you to) for the 24 hours before and after surgery.    If you take prescribed drugs: Follow your doctor s orders about which medicines to take and which to stop until after surgery.    Eating and drinking prior to surgery: follow the instructions from your surgeon    Take a shower or bath the night before surgery. Use the soap your surgeon gave you to gently clean your skin. If you do not have soap from your surgeon, use your regular soap. Do not shave or scrub the surgery site.  Wear clean pajamas and have clean sheets on your bed.           Follow-ups after your visit        Your next 10 appointments already scheduled     Dec 15, 2017   Procedure with Shalini Nichole MD   [facility], Same Day Surgery (--)    [address] [address]   [phone]            Dec 27, 2017  8:15 AM CST   Return Visit with Shalini Nichole MD   [facility] ([facility])    [facility] [address] [facility] [address]  [address] [address]   [phone]              Who to contact     Normal or non-critical lab and imaging results will be communicated to you by MyChart, letter or phone within 4 business days after the clinic has received the results. If you do not hear  "from us within 7 days, please contact the clinic through [employer] or phone. If you have a critical or abnormal lab result, we will notify you by phone as soon as possible.  Submit refill requests through [employer] or call your pharmacy and they will forward the refill request to us. Please allow 3 business days for your refill to be completed.          If you need to speak with a  for additional information , please call: [phone]             Additional Information About Your Visit        [employer] Information     [employer] gives you secure access to your electronic health record. If you see a primary care provider, you can also send messages to your care team and make appointments. If you have questions, please call your primary care clinic.  If you do not have a primary care provider, please call [phone] and they will assist you.        Care EveryWhere ID     This is your Care EveryWhere ID. This could be used by other organizations to access your [facility] medical records  [id number]        Your Vitals Were     Pulse Temperature Height Last Period Pulse Oximetry Breastfeeding?    71 97.8  F (36.6  C) (Tympanic) 5' 4\" (1.626 m) 11/25/2017 (Exact Date) 99% No    BMI (Body Mass Index)                   28.67 kg/m2            Blood Pressure from Last 3 Encounters:   12/08/17 124/78   11/20/17 118/70   11/01/17 119/75    Weight from Last 3 Encounters:   12/08/17 167 lb (75.8 kg)   11/20/17 167 lb (75.8 kg)   11/01/17 166 lb 1.6 oz (75.3 kg)              Today, you had the following     No orders found for display       Primary Care Provider Office Phone # Fax #    Katerin Smith [phone]       [address] [place] [address]        Equal Access to Services     SHELLY PIERCE : Hadii brent Sage, warenita kim, qaybta kacrissy jorgensen. So [facility] [phone].    ATENCIÓN: Si habla español, tiene a shafer disposición " servicios gratuitos de asistencia lingüística. Horacio ruff [phone].    We comply with applicable federal civil rights laws and [place] laws. We do not discriminate on the basis of race, color, national origin, age, disability, sex, sexual orientation, or gender identity.            Thank you!     Thank you for choosing [facility]  for your care. Our goal is always to provide you with excellent care. Hearing back from our patients is one way we can continue to improve our services. Please take a few minutes to complete the written survey that you may receive in the mail after your visit with us. Thank you!             Your Updated Medication List - Protect others around you: Learn how to safely use, store and throw away your medicines at www.disposemymeds.org.      Notice  As of 12/8/2017 11:17 AM    You have not been prescribed any medications.

## 2017-12-08 NOTE — PROGRESS NOTES
[facility]  [address]  [place] [address]  [phone]  Dept: [phone]    PRE-OP EVALUATION:  Today's date: 2017    Aspen Gonzalez (: 1985) presents for pre-operative evaluation assessment as requested by Dr. Nichole, Shalini Baker MD.  She requires evaluation and anesthesia risk assessment prior to undergoing surgery/procedure for treatment of possible uterine septum and Possible Endometrial Polyp  .  Proposed procedure: OPERATIVE HYSTEROSCOPY WITH MORCELLATOR/ RESECT VAGINAL SEPTUM.    Date of Surgery/ Procedure: 12/15/17  Time of Surgery/ Procedure: 11:00 AM  Hospital/Surgical Facility: [facility]  Fax number for surgical facility: [phone]  Primary Physician: Maikel Apple  Type of Anesthesia Anticipated: Combined MAC with Local    Patient has a Health Care Directive or Living Will:  NO    1. NO - Do you have a history of heart attack, stroke, stent, bypass or surgery on an artery in the head, neck, heart or legs?  2. NO - Do you ever have any pain or discomfort in your chest?  3. NO - Do you have a history of  Heart Failure?  4. NO - Are you troubled by shortness of breath when: walking on the level, up a slight hill or at night?  5. NO - Do you currently have a cold, bronchitis or other respiratory infection?  6. NO - Do you have a cough, shortness of breath or wheezing?  7. NO - Do you sometimes get pains in the calves of your legs when you walk?  8. NO - Do you or anyone in your family have previous history of blood clots?  9. NO - Do you or does anyone in your family have a serious bleeding problem such as prolonged bleeding following surgeries or cuts?  10. NO - Have you ever had problems with anemia or been told to take iron pills?  11. NO - Have you had any abnormal blood loss such as black, tarry or bloody stools, or abnormal vaginal bleeding?  12. NO - Have you ever had a blood transfusion?  13. NO - Have you or any of your  relatives ever had problems with anesthesia?  14. NO - Do you have sleep apnea, excessive snoring or daytime drowsiness?  15. NO - Do you have any prosthetic heart valves?  16. NO - Do you have prosthetic joints?  17. NO - Is there any chance that you may be pregnant?        HPI:                                                      Brief HPI related to upcoming procedure:   32 year old female here for a pre-op exam.   Recently had a missed  thought to be due to septate uterus that was identified on a dating US.   Had to undergo a D & C.   Now wishes to proceed with an operative hysteroscopy to resect the septate uterus and try conceiving again in the near future.   States that she understands the risks and benefits of the procedure and wishes to proceed.   Otherwise healthy. No concerns today.       See problem list for active medical problems.  Problems all longstanding and stable, except as noted/documented.  See ROS for pertinent symptoms related to these conditions.                                                                                                  .    MEDICAL HISTORY:                                                    Patient Active Problem List    Diagnosis Date Noted     Septate uterus - possible identified on dating US 10/16/2017     Priority: Medium     Missed  - Desirse D&C, orders requested 10/16/17 10/05/2017     Priority: Medium     Screening for malignant neoplasm of cervix 2014     Priority: Medium     Prior PAP in [facility], summer , and had 1-2 prior.  All normal per pt.  Plans for PAP in  (q 5 year testing).  No hx of cervical, breast or ovarian cancers  Problem list name updated by automated process. Provider to review        Past Medical History:   Diagnosis Date     History of blood transfusion      History of carbon monoxide poisoning     age 7, hospitalized x 2 days     Septate uterus - possible noted on OB intake US 10/16/2017     Past Surgical  "History:   Procedure Laterality Date     DILATION AND CURETTAGE SUCTION N/A 10/20/2017    Procedure: DILATION AND CURETTAGE SUCTION;  Suction Dilation and Curettage ;  Surgeon: Kathrin Donnelly MD;  Location: [facility] OR     HC TOOTH EXTRACTION W/FORCEP       No current outpatient prescriptions on file.     OTC products: None, except as noted above    Allergies   Allergen Reactions     Penicillins       Latex Allergy: NO    Social History   Substance Use Topics     Smoking status: Never Smoker     Smokeless tobacco: Never Used     Alcohol use No     History   Drug Use No       REVIEW OF SYSTEMS:                                                    Constitutional, neuro, ENT, endocrine, pulmonary, cardiac, gastrointestinal, genitourinary, musculoskeletal, integument and psychiatric systems are negative, except as otherwise noted.      EXAM:                                                    /78  Pulse 71  Temp 97.8  F (36.6  C) (Tympanic)  Ht 5' 4\" (1.626 m)  Wt 167 lb (75.8 kg)  LMP 11/25/2017 (Exact Date)  SpO2 99%  Breastfeeding? No  BMI 28.67 kg/m2    GENERAL APPEARANCE: healthy, alert and no distress     EYES: EOMI, PERRL     HENT: ear canals and TM's normal and nose and mouth without ulcers or lesions     NECK: no adenopathy, no asymmetry, masses, or scars and thyroid normal to palpation     RESP: lungs clear to auscultation - no rales, rhonchi or wheezes     CV: regular rates and rhythm, normal S1 S2, no S3 or S4 and no murmur, click or rub     ABDOMEN:  soft, nontender, no HSM or masses and bowel sounds normal     MS: extremities normal- no gross deformities noted, no evidence of inflammation in joints, FROM in all extremities.     SKIN: no suspicious lesions or rashes     NEURO: Normal strength and tone, sensory exam grossly normal, mentation intact and speech normal     PSYCH: mentation appears normal. and affect normal/bright     LYMPHATICS: No axillary, cervical, or supraclavicular " nodes    DIAGNOSTICS:                                                      No labs or EKG required for low risk surgery (cataract, skin procedure, breast biopsy, etc)    Labs Drawn and in Process:   Unresulted Labs Ordered in the Past 30 Days of this Admission     No orders found from 10/9/2017 to 2017.          Recent Labs   Lab Test  10/16/17   1035   HGB  13.1   PLT  238        IMPRESSION:                                                    Reason for surgery/procedure: OPERATIVE HYSTEROSCOPY WITH MORCELLATOR/ RESECT VAGINAL SEPTUM.  Diagnosis/reason for consult: Anomaly of the uterus- septate uterus with missed     The proposed surgical procedure is considered INTERMEDIATE risk.    REVISED CARDIAC RISK INDEX  The patient has the following serious cardiovascular risks for perioperative complications such as (MI, PE, VFib and 3  AV Block):  No serious cardiac risks  INTERPRETATION: 0 risks: Class I (very low risk - 0.4% complication rate)    The patient has the following additional risks for perioperative complications:  No identified additional risks      ICD-10-CM    1. Preop general physical exam Z01.818    2. Anomaly of uterus Q51.9    3. Missed  O02.1        RECOMMENDATIONS:                                                        Cardiovascular Risk  None identified      Pulmonary Risk  None identified      --Patient does not take any scheduled medications.  Instructed to avoid any OTC NSAID or Aspirin a week prior to surgery      APPROVAL GIVEN to proceed with proposed procedure, without further diagnostic evaluation       Signed Electronically by: Katerin Smith MD    Copy of this evaluation report is provided to requesting physician.    [place] Preop Guidelines

## 2017-12-15 ENCOUNTER — SURGERY (OUTPATIENT)
Age: 32
End: 2017-12-15

## 2017-12-15 ENCOUNTER — ANESTHESIA EVENT (OUTPATIENT)
Dept: SURGERY | Facility: CLINIC | Age: 32
End: 2017-12-15
Payer: COMMERCIAL

## 2017-12-15 ENCOUNTER — HOSPITAL ENCOUNTER (OUTPATIENT)
Facility: CLINIC | Age: 32
Discharge: HOME | End: 2017-12-15
Attending: OBSTETRICS & GYNECOLOGY | Admitting: OBSTETRICS & GYNECOLOGY
Payer: COMMERCIAL

## 2017-12-15 ENCOUNTER — ANESTHESIA (OUTPATIENT)
Dept: SURGERY | Facility: CLINIC | Age: 32
End: 2017-12-15
Payer: COMMERCIAL

## 2017-12-15 VITALS
TEMPERATURE: 98.2 F | SYSTOLIC BLOOD PRESSURE: 110 MMHG | DIASTOLIC BLOOD PRESSURE: 66 MMHG | OXYGEN SATURATION: 96 % | WEIGHT: 166.89 LBS | BODY MASS INDEX: 28.49 KG/M2 | RESPIRATION RATE: 16 BRPM | HEIGHT: 64 IN

## 2017-12-15 LAB
ABO + RH BLD: NORMAL
ABO + RH BLD: NORMAL
BLD GP AB SCN SERPL QL: NORMAL
BLOOD BANK CMNT PATIENT-IMP: NORMAL
ERYTHROCYTE [DISTWIDTH] IN BLOOD BY AUTOMATED COUNT: 12.3 % (ref 10–15)
GLUCOSE SERPL-MCNC: 81 MG/DL (ref 70–99)
HCG UR QL: NEGATIVE
HCT VFR BLD AUTO: 40.4 % (ref 35–47)
HGB BLD-MCNC: 13.1 G/DL (ref 11.7–15.7)
MCH RBC QN AUTO: 30.3 PG (ref 26.5–33)
MCHC RBC AUTO-ENTMCNC: 32.4 G/DL (ref 31.5–36.5)
MCV RBC AUTO: 93 FL (ref 78–100)
PLATELET # BLD AUTO: 189 10E9/L (ref 150–450)
RBC # BLD AUTO: 4.33 10E12/L (ref 3.8–5.2)
SPECIMEN EXP DATE BLD: NORMAL
WBC # BLD AUTO: 5.3 10E9/L (ref 4–11)

## 2017-12-15 PROCEDURE — 85027 COMPLETE CBC AUTOMATED: CPT | Performed by: ANESTHESIOLOGY

## 2017-12-15 PROCEDURE — 88305 TISSUE EXAM BY PATHOLOGIST: CPT | Performed by: OBSTETRICS & GYNECOLOGY

## 2017-12-15 PROCEDURE — 25000125 ZZHC RX 250: Performed by: NURSE ANESTHETIST, CERTIFIED REGISTERED

## 2017-12-15 PROCEDURE — 25000128 H RX IP 250 OP 636: Performed by: NURSE ANESTHETIST, CERTIFIED REGISTERED

## 2017-12-15 PROCEDURE — 36415 COLL VENOUS BLD VENIPUNCTURE: CPT | Performed by: ANESTHESIOLOGY

## 2017-12-15 PROCEDURE — 27210794 ZZH OR GENERAL SUPPLY STERILE: Performed by: OBSTETRICS & GYNECOLOGY

## 2017-12-15 PROCEDURE — 37000008 ZZH ANESTHESIA TECHNICAL FEE, 1ST 30 MIN: Performed by: OBSTETRICS & GYNECOLOGY

## 2017-12-15 PROCEDURE — 86901 BLOOD TYPING SEROLOGIC RH(D): CPT | Performed by: OBSTETRICS & GYNECOLOGY

## 2017-12-15 PROCEDURE — 71000027 ZZH RECOVERY PHASE 2 EACH 15 MINS: Performed by: OBSTETRICS & GYNECOLOGY

## 2017-12-15 PROCEDURE — 25000132 ZZH RX MED GY IP 250 OP 250 PS 637: Performed by: ANESTHESIOLOGY

## 2017-12-15 PROCEDURE — 88305 TISSUE EXAM BY PATHOLOGIST: CPT | Mod: 26 | Performed by: OBSTETRICS & GYNECOLOGY

## 2017-12-15 PROCEDURE — 86900 BLOOD TYPING SEROLOGIC ABO: CPT | Performed by: OBSTETRICS & GYNECOLOGY

## 2017-12-15 PROCEDURE — 82947 ASSAY GLUCOSE BLOOD QUANT: CPT | Performed by: ANESTHESIOLOGY

## 2017-12-15 PROCEDURE — 37000009 ZZH ANESTHESIA TECHNICAL FEE, EACH ADDTL 15 MIN: Performed by: OBSTETRICS & GYNECOLOGY

## 2017-12-15 PROCEDURE — 25800025 ZZH RX 258: Performed by: OBSTETRICS & GYNECOLOGY

## 2017-12-15 PROCEDURE — 40000170 ZZH STATISTIC PRE-PROCEDURE ASSESSMENT II: Performed by: OBSTETRICS & GYNECOLOGY

## 2017-12-15 PROCEDURE — 25000125 ZZHC RX 250: Performed by: OBSTETRICS & GYNECOLOGY

## 2017-12-15 PROCEDURE — 86850 RBC ANTIBODY SCREEN: CPT | Performed by: OBSTETRICS & GYNECOLOGY

## 2017-12-15 PROCEDURE — 36000057 ZZH SURGERY LEVEL 3 1ST 30 MIN - UMMC: Performed by: OBSTETRICS & GYNECOLOGY

## 2017-12-15 PROCEDURE — 81025 URINE PREGNANCY TEST: CPT | Performed by: OBSTETRICS & GYNECOLOGY

## 2017-12-15 PROCEDURE — 36000059 ZZH SURGERY LEVEL 3 EA 15 ADDTL MIN UMMC: Performed by: OBSTETRICS & GYNECOLOGY

## 2017-12-15 RX ORDER — NALOXONE HYDROCHLORIDE 0.4 MG/ML
.1-.4 INJECTION, SOLUTION INTRAMUSCULAR; INTRAVENOUS; SUBCUTANEOUS
Status: DISCONTINUED | OUTPATIENT
Start: 2017-12-15 | End: 2017-12-15 | Stop reason: HOSPADM

## 2017-12-15 RX ORDER — SODIUM CHLORIDE, SODIUM LACTATE, POTASSIUM CHLORIDE, CALCIUM CHLORIDE 600; 310; 30; 20 MG/100ML; MG/100ML; MG/100ML; MG/100ML
INJECTION, SOLUTION INTRAVENOUS CONTINUOUS PRN
Status: DISCONTINUED | OUTPATIENT
Start: 2017-12-15 | End: 2017-12-15

## 2017-12-15 RX ORDER — FENTANYL CITRATE 50 UG/ML
INJECTION, SOLUTION INTRAMUSCULAR; INTRAVENOUS PRN
Status: DISCONTINUED | OUTPATIENT
Start: 2017-12-15 | End: 2017-12-15

## 2017-12-15 RX ORDER — LIDOCAINE 40 MG/G
CREAM TOPICAL
Status: DISCONTINUED | OUTPATIENT
Start: 2017-12-15 | End: 2017-12-15 | Stop reason: HOSPADM

## 2017-12-15 RX ORDER — PROPOFOL 10 MG/ML
INJECTION, EMULSION INTRAVENOUS PRN
Status: DISCONTINUED | OUTPATIENT
Start: 2017-12-15 | End: 2017-12-15

## 2017-12-15 RX ORDER — DEXAMETHASONE SODIUM PHOSPHATE 4 MG/ML
INJECTION, SOLUTION INTRA-ARTICULAR; INTRALESIONAL; INTRAMUSCULAR; INTRAVENOUS; SOFT TISSUE PRN
Status: DISCONTINUED | OUTPATIENT
Start: 2017-12-15 | End: 2017-12-15

## 2017-12-15 RX ORDER — SODIUM CHLORIDE, SODIUM LACTATE, POTASSIUM CHLORIDE, CALCIUM CHLORIDE 600; 310; 30; 20 MG/100ML; MG/100ML; MG/100ML; MG/100ML
INJECTION, SOLUTION INTRAVENOUS CONTINUOUS
Status: DISCONTINUED | OUTPATIENT
Start: 2017-12-15 | End: 2017-12-15 | Stop reason: HOSPADM

## 2017-12-15 RX ORDER — GABAPENTIN 300 MG/1
300 CAPSULE ORAL ONCE
Status: COMPLETED | OUTPATIENT
Start: 2017-12-15 | End: 2017-12-15

## 2017-12-15 RX ORDER — MEPERIDINE HYDROCHLORIDE 25 MG/ML
12.5 INJECTION INTRAMUSCULAR; INTRAVENOUS; SUBCUTANEOUS
Status: DISCONTINUED | OUTPATIENT
Start: 2017-12-15 | End: 2017-12-15 | Stop reason: HOSPADM

## 2017-12-15 RX ORDER — LIDOCAINE HYDROCHLORIDE 20 MG/ML
INJECTION, SOLUTION INFILTRATION; PERINEURAL PRN
Status: DISCONTINUED | OUTPATIENT
Start: 2017-12-15 | End: 2017-12-15

## 2017-12-15 RX ORDER — ONDANSETRON 4 MG/1
4 TABLET, ORALLY DISINTEGRATING ORAL EVERY 30 MIN PRN
Status: DISCONTINUED | OUTPATIENT
Start: 2017-12-15 | End: 2017-12-15 | Stop reason: HOSPADM

## 2017-12-15 RX ORDER — ACETAMINOPHEN 325 MG/1
975 TABLET ORAL ONCE
Status: COMPLETED | OUTPATIENT
Start: 2017-12-15 | End: 2017-12-15

## 2017-12-15 RX ORDER — FENTANYL CITRATE 50 UG/ML
25-50 INJECTION, SOLUTION INTRAMUSCULAR; INTRAVENOUS EVERY 5 MIN PRN
Status: DISCONTINUED | OUTPATIENT
Start: 2017-12-15 | End: 2017-12-15 | Stop reason: HOSPADM

## 2017-12-15 RX ORDER — ONDANSETRON 2 MG/ML
INJECTION INTRAMUSCULAR; INTRAVENOUS PRN
Status: DISCONTINUED | OUTPATIENT
Start: 2017-12-15 | End: 2017-12-15

## 2017-12-15 RX ORDER — LIDOCAINE HYDROCHLORIDE 10 MG/ML
INJECTION, SOLUTION EPIDURAL; INFILTRATION; INTRACAUDAL; PERINEURAL PRN
Status: DISCONTINUED | OUTPATIENT
Start: 2017-12-15 | End: 2017-12-15 | Stop reason: HOSPADM

## 2017-12-15 RX ORDER — KETOROLAC TROMETHAMINE 30 MG/ML
INJECTION, SOLUTION INTRAMUSCULAR; INTRAVENOUS PRN
Status: DISCONTINUED | OUTPATIENT
Start: 2017-12-15 | End: 2017-12-15

## 2017-12-15 RX ORDER — ONDANSETRON 2 MG/ML
4 INJECTION INTRAMUSCULAR; INTRAVENOUS EVERY 30 MIN PRN
Status: DISCONTINUED | OUTPATIENT
Start: 2017-12-15 | End: 2017-12-15 | Stop reason: HOSPADM

## 2017-12-15 RX ORDER — PROPOFOL 10 MG/ML
INJECTION, EMULSION INTRAVENOUS CONTINUOUS PRN
Status: DISCONTINUED | OUTPATIENT
Start: 2017-12-15 | End: 2017-12-15

## 2017-12-15 RX ADMIN — ACETAMINOPHEN 975 MG: 325 TABLET, FILM COATED ORAL at 09:35

## 2017-12-15 RX ADMIN — GABAPENTIN 300 MG: 300 CAPSULE ORAL at 09:36

## 2017-12-15 RX ADMIN — FENTANYL CITRATE 50 MCG: 50 INJECTION, SOLUTION INTRAMUSCULAR; INTRAVENOUS at 10:13

## 2017-12-15 RX ADMIN — KETOROLAC TROMETHAMINE 30 MG: 30 INJECTION, SOLUTION INTRAMUSCULAR at 10:46

## 2017-12-15 RX ADMIN — LIDOCAINE HYDROCHLORIDE 20 ML: 10 INJECTION, SOLUTION EPIDURAL; INFILTRATION; INTRACAUDAL; PERINEURAL at 10:35

## 2017-12-15 RX ADMIN — SODIUM CHLORIDE, POTASSIUM CHLORIDE, SODIUM LACTATE AND CALCIUM CHLORIDE: 600; 310; 30; 20 INJECTION, SOLUTION INTRAVENOUS at 10:13

## 2017-12-15 RX ADMIN — ONDANSETRON 4 MG: 2 INJECTION INTRAMUSCULAR; INTRAVENOUS at 10:46

## 2017-12-15 RX ADMIN — MIDAZOLAM 2 MG: 1 INJECTION INTRAMUSCULAR; INTRAVENOUS at 10:13

## 2017-12-15 RX ADMIN — PROPOFOL 150 MCG/KG/MIN: 10 INJECTION, EMULSION INTRAVENOUS at 10:18

## 2017-12-15 RX ADMIN — DEXAMETHASONE SODIUM PHOSPHATE 4 MG: 4 INJECTION, SOLUTION INTRAMUSCULAR; INTRAVENOUS at 10:23

## 2017-12-15 RX ADMIN — LIDOCAINE HYDROCHLORIDE 70 MG: 20 INJECTION, SOLUTION INFILTRATION; PERINEURAL at 10:18

## 2017-12-15 RX ADMIN — PROPOFOL 70 MG: 10 INJECTION, EMULSION INTRAVENOUS at 10:18

## 2017-12-15 RX ADMIN — SODIUM CHLORIDE 2280 ML: 900 IRRIGANT IRRIGATION at 11:08

## 2017-12-15 ASSESSMENT — LIFESTYLE VARIABLES: TOBACCO_USE: 0

## 2017-12-15 NOTE — IP AVS SNAPSHOT
MAIN OR    [address]    [place] [address]    Phone:  [phone]                                       After Visit Summary   12/15/2017    Aspen Gonzalez    MRN: [id number]           After Visit Summary Signature Page     I have received my discharge instructions, and my questions have been answered. I have discussed any challenges I see with this plan with the nurse or doctor.    ..........................................................................................................................................  Patient/Patient Representative Signature      ..........................................................................................................................................  Patient Representative Print Name and Relationship to Patient    ..................................................               ................................................  Date                                            Time    ..........................................................................................................................................  Reviewed by Signature/Title    ...................................................              ..............................................  Date                                                            Time

## 2017-12-15 NOTE — ANESTHESIA POSTPROCEDURE EVALUATION
Patient: Aspen Gonzalez    Procedure(s):  Operative Hysteroscopy, Resection of Polyp - Wound Class: II-Clean Contaminated    Diagnosis:Uterine Septum and Possible Endometrial Polyp  Diagnosis Additional Information: No value filed.    Anesthesia Type:  MAC    Note:  Anesthesia Post Evaluation    Patient location during evaluation: Phase 2  Patient participation: Able to fully participate in evaluation  Level of consciousness: awake and alert  Pain management: adequate  Airway patency: patent  Cardiovascular status: acceptable  Respiratory status: acceptable  Hydration status: acceptable  PONV: none     Anesthetic complications: None          Last vitals:  Vitals:    12/15/17 1100 12/15/17 1115 12/15/17 1130   BP: 113/79 109/73 107/73   Resp: 16 16 16   Temp: 35.6  C (96.1  F)     SpO2: 100% 98% 98%         Electronically Signed By: Bharathi Avila DO  December 15, 2017  12:20 PM

## 2017-12-15 NOTE — BRIEF OP NOTE
Gynecology Brief Op Note    Aspen Gonzalez  [id number]    Date of Surgery: 12/15/2017    Surgeon: Shalini Nichole MD    Assistants: Gracie Leon MD PGY-1    Pre-operative Diagnoses: Septate uterus, possible endometrial polyp    Post-operative Diagnoses: Endometrial polyp    Procedure: EUA, operative hysteroscopy, polypectomy    Anesthesia: MAC    EBL: 5cc  IVF: 700cc  UOP: not measured  FD: 265cc    Complications: None apparent  Findings: Exam under anesthesia revealed small, mobile anteverted uterus. Speculum exam showed normal cervix without lesions. Upon entry of the hysteroscope, no septum was seen in the endometrial cavity. A small polyp was seen near the internal os and sharply excised. Bilateral tubal ostia visualized bilaterally. Hemostasis at end of case.   Specimens: Endometrial polyp       Gracie Leon MD   PGY1, OB/GYN

## 2017-12-15 NOTE — DISCHARGE INSTRUCTIONS
Same-Day Surgery   Adult Discharge Orders & Instructions     For 24 hours after surgery:  1. Get plenty of rest.  A responsible adult must stay with you for at least 24 hours after you leave the hospital.   2. Pain medication can slow your reflexes. Do not drive or use heavy equipment.  If you have weakness or tingling, don't drive or use heavy equipment until this feeling goes away.  3. Mixing alcohol and pain medication can cause dizziness and slow your breathing. It can even be fatal. Do not drink alcohol while taking pain medication.  4. Avoid strenuous or risky activities.  Ask for help when climbing stairs.   5. You may feel lightheaded.  If so, sit for a few minutes before standing.  Have someone help you get up.   6. If you have nausea (feel sick to your stomach), drink only clear liquids such as apple juice, ginger ale, broth or 7-Up.  Rest may also help.  Be sure to drink enough fluids.  Move to a regular diet as you feel able. Take pain medications with a small amount of solid food, such as toast or crackers, to avoid nausea.   7. A slight fever is normal. Call the doctor if your fever is over 100 F (37.7 C) (taken under the tongue) or lasts longer than 24 hours.  8. You may have a dry mouth, muscle aches, trouble sleeping or a sore throat.  These symptoms should go away after 24 hours.  9. Do not make important or legal decisions.   Pain Management:      1. Take pain medication (if prescribed) for pain as directed by your physician.        2. WARNING: If the pain medication you have been prescribed contains Tylenol  (acetaminophen), DO NOT take additional doses of Tylenol (acetaminophen).     Call your doctor for any of the followin.  Signs of infection (fever, growing tenderness at the surgery site, severe pain, a large amount of drainage or bleeding, foul-smelling drainage, redness, swelling).    2.  It has been over 8 to 10 hours since surgery and you are still not able to urinate (pee).    3.   Headache for over 24 hours.    4.  Numbness, tingling or weakness the day after surgery (if you had spinal anesthesia).  To contact a doctor, call _____________________________________ or:      [phone] and ask for the Resident On Call for:          _______ob/gyn___________________________________ (answered 24 hours a day)      Emergency Department:  [place] [facility]: [phone]  [facility]: [phone]               Rev. 10/2014   .

## 2017-12-15 NOTE — IP AVS SNAPSHOT
MRN:[id number]                      After Visit Summary   12/15/2017    Aspen Gonzalez    MRN: [id number]           Thank you!     Thank you for choosing [facility] for your care. Our goal is always to provide you with excellent care. Hearing back from our patients is one way we can continue to improve our services. Please take a few minutes to complete the written survey that you may receive in the mail after you visit with us. Thank you!        Patient Information     Date Of Birth          1985        About your hospital stay     You were admitted on:  December 15, 2017 You last received care in the:   MAIN OR    You were discharged on:  December 15, 2017       Who to Call     For medical emergencies, please call 911.  For non-urgent questions about your medical care, please call your primary care provider or clinic, [phone]  For questions related to your surgery, please call your surgery clinic        Attending Provider     Provider Shalini Tirado MD OB/Gyn       Primary Care Provider Office Phone # Fax #    Katerin Smith [phone]      After Care Instructions     Discharge Instructions       Resume pre procedure diet            Discharge Instructions       Pelvic Rest. No tampons, douching or intercourse for  2  weeks.            No driving or operating machinery       No driving or operating machinery until day after procedure                  Your next 10 appointments already scheduled     Dec 15, 2017   Procedure with Shalini Nichole MD   [facility], [facility], Same Day Surgery (--)    [address] [address]   [phone]            Dec 27, 2017  8:15 AM CST   Return Visit with Shalini Nichole MD   [facility] ([facility])    [facility] [address]  [address] [facility] [address] [phone]              Further instructions from your  care team       Same-Day Surgery   Adult Discharge Orders & Instructions     For 24 hours after surgery:  1. Get plenty of rest.  A responsible adult must stay with you for at least 24 hours after you leave the hospital.   2. Pain medication can slow your reflexes. Do not drive or use heavy equipment.  If you have weakness or tingling, don't drive or use heavy equipment until this feeling goes away.  3. Mixing alcohol and pain medication can cause dizziness and slow your breathing. It can even be fatal. Do not drink alcohol while taking pain medication.  4. Avoid strenuous or risky activities.  Ask for help when climbing stairs.   5. You may feel lightheaded.  If so, sit for a few minutes before standing.  Have someone help you get up.   6. If you have nausea (feel sick to your stomach), drink only clear liquids such as apple juice, ginger ale, broth or 7-Up.  Rest may also help.  Be sure to drink enough fluids.  Move to a regular diet as you feel able. Take pain medications with a small amount of solid food, such as toast or crackers, to avoid nausea.   7. A slight fever is normal. Call the doctor if your fever is over 100 F (37.7 C) (taken under the tongue) or lasts longer than 24 hours.  8. You may have a dry mouth, muscle aches, trouble sleeping or a sore throat.  These symptoms should go away after 24 hours.  9. Do not make important or legal decisions.   Pain Management:      1. Take pain medication (if prescribed) for pain as directed by your physician.        2. WARNING: If the pain medication you have been prescribed contains Tylenol  (acetaminophen), DO NOT take additional doses of Tylenol (acetaminophen).     Call your doctor for any of the followin.  Signs of infection (fever, growing tenderness at the surgery site, severe pain, a large amount of drainage or bleeding, foul-smelling drainage, redness, swelling).    2.  It has been over 8 to 10 hours since surgery and you are still not able to urinate  "(pee).    3.  Headache for over 24 hours.    4.  Numbness, tingling or weakness the day after surgery (if you had spinal anesthesia).  To contact a doctor, call _____________________________________ or:      [phone] and ask for the Resident On Call for:          _______ob/gyn___________________________________ (answered 24 hours a day)      Emergency Department:  [facility]: [phone]  [facility]: [phone]               Rev. 10/2014       Pending Results     No orders found from 12/13/2017 to 12/16/2017.            Admission Information     Date & Time Provider Department Dept. Phone    12/15/2017 Shalini Nichole MD [facility] MAIN [phone]      Your Vitals Were     Blood Pressure Temperature Respirations Height Weight Last Period    114/81 98.1  F (36.7  C) (Oral) 18 1.626 m (5' 4\") 75.7 kg (166 lb 14.2 oz) 11/25/2017 (Exact Date)    Pulse Oximetry BMI (Body Mass Index)                97% 28.65 kg/m2          MyChart Information     [facility] gives you secure access to your electronic health record. If you see a primary care provider, you can also send messages to your care team and make appointments. If you have questions, please call your primary care clinic.  If you do not have a primary care provider, please call [phone] and they will assist you.        Care EveryWhere ID     This is your Care EveryWhere ID. This could be used by other organizations to access your [facility] medical records  [id number]        Equal Access to Services     GLYNN PIERCE : Julien Sage, luis kim, meseret acevedo, crissy corbin. So [facility] [phone].    ATENCIÓN: Si habla español, tiene a shafer disposición servicios gratuitos de asistencia lingüística. Llame al [phone].    We comply with applicable federal civil rights laws and [place] laws. We do not discriminate on the basis of race, color, national " origin, age, disability, sex, sexual orientation, or gender identity.               Review of your medicines      Notice     You have not been prescribed any medications.             Protect others around you: Learn how to safely use, store and throw away your medicines at www.disposemymeds.org.             Medication List: This is a list of all your medications and when to take them. Check marks below indicate your daily home schedule. Keep this list as a reference.      Notice     You have not been prescribed any medications.

## 2017-12-15 NOTE — ANESTHESIA PREPROCEDURE EVALUATION
Anesthesia Evaluation     . Pt has had prior anesthetic.     No history of anesthetic complications          ROS/MED HX    ENT/Pulmonary:  - neg pulmonary ROS    (-) tobacco use   Neurologic:  - neg neurologic ROS     Cardiovascular:  - neg cardiovascular ROS   (+) ----. : . . . :. . No previous cardiac testing       METS/Exercise Tolerance:  >4 METS   Hematologic:  - neg hematologic  ROS   (+) History of blood clots -      Musculoskeletal:  - neg musculoskeletal ROS       GI/Hepatic:  - neg GI/hepatic ROS       Renal/Genitourinary:     (+) Other Renal/ Genitourinary (Septate uterus ),       Endo:  - neg endo ROS       Psychiatric:  - neg psychiatric ROS       Infectious Disease:  - neg infectious disease ROS       Malignancy:      - no malignancy   Other:    - neg other ROS                 Physical Exam  Normal systems: cardiovascular, pulmonary and dental    Airway   Mallampati: I  TM distance: >3 FB  Neck ROM: full    Dental     Cardiovascular   Rhythm and rate: regular and normal      Pulmonary    breath sounds clear to auscultation                    Anesthesia Plan      History & Physical Review  History and physical reviewed and following examination; no interval change.    ASA Status:  1 .    NPO Status:  > 8 hours    Plan for MAC with Intravenous and Propofol induction. Reason for MAC:  Deep or markedly invasive procedure (G8)  PONV prophylaxis:  Ondansetron (or other 5HT-3)       Postoperative Care  Postoperative pain management:  Multi-modal analgesia.      Consents  Anesthetic plan, risks, benefits and alternatives discussed with:  Patient.  Use of blood products discussed: Yes.   Use of blood products discussed with Patient.  Consented to blood products.  .                          .

## 2017-12-15 NOTE — OP NOTE
[facility]   Operative Note    Date of Surgery: 12/15/2017     Surgeon: Shalini Nichole MD     Assistants: Gracie Leon MD PGY-1     Pre-operative Diagnoses: Septate uterus, possible endometrial polyp     Post-operative Diagnoses: Endometrial polyp     Procedure: EUA, operative hysteroscopy, polypectomy     Anesthesia: MAC     EBL: 5cc  IVF: 700cc  UOP: not measured  FD: 265cc     Complications: None apparent    Findings: Exam under anesthesia revealed small, mobile anteverted uterus. Speculum exam showed normal cervix without lesions. Upon entry of the hysteroscope, no septum was seen in the endometrial cavity. A small polyp was seen near the internal os and sharply excised. Bilateral tubal ostia visualized bilaterally. Hemostasis at end of case.     Specimens: Endometrial polyp      Indications:  Aspen Gonzalez is a 32 year old  who presented to clinic with a history of a recent spontaneous  and possible septate uterus. An saline infusion sonohysterogram was performed in clinic with findings including a septum and possible polyp. She was offered an operative hysteroscopy.  Risks, benefits, and alternatives to the procedure were discussed with the patient who elected to proceed.  All questions were answered and an informed consent was obtained.    Procedure:  The patient was taken to the operating room where she underwent MAC anesthesia without difficulty.  She was placed in a dorsal lithotomy position using yellow-fin stirrups.  The patient was examined for the above noted findings and then prepped and draped in the usual sterile fashion.  A medium graves speculum was inserted into the vagina. 1cc 1% plain lidocaine was injected into the cervix at 12 o'clock position. A tenaculum was placed on the anterior cervical lip.  A paracervical block was administered with an additional 10cc 1% plain lidocaine at the 4 and 8 o'clock positions. The endocervical canal was serially dilated to 6.5 mm  using Hegar dilators. The hysteroscope was inserted without difficulty with the above findings noted.  The hysteroscopic scissors were inserted and used to sharply remove the polyp at its base. The hysteroscope was removed. The tenaculum was removed from the cervix and the puncture sites noted to be hemostatic with pressure.  The patient was repositioned to the supine position.  The patient tolerated the procedure well and was taken to the recovery room in stable condition.  Dr. Nichole was scrubbed and present for the entire procedure.    Gracie Leon MD   Resident Physician, PGY1  Obstetrics, Gynecology, and Women's Health      Staff:  I was scrubbed and present for entire case and agree w/ above note.    Shalini Nichole MD

## 2017-12-15 NOTE — ANESTHESIA CARE TRANSFER NOTE
Patient: Aspen Gonzalez    Procedure(s):  Operative Hysteroscopy, Resection of Polyp - Wound Class: II-Clean Contaminated    Diagnosis: Uterine Septum and Possible Endometrial Polyp  Diagnosis Additional Information: No value filed.    Anesthesia Type:   MAC     Note:  Airway :Room Air  Patient transferred to:PACU  Comments: Arrived in PACU, report to RN, vitals stable, patient comfortable.  Handoff Report: Identifed the Patient, Identified the Reponsible Provider, Reviewed the pertinent medical history, Discussed the surgical course, Reviewed Intra-OP anesthesia mangement and issues during anesthesia, Set expectations for post-procedure period and Allowed opportunity for questions and acknowledgement of understanding      Vitals: (Last set prior to Anesthesia Care Transfer)    [facility] VITALS  12/15/2017 1025 - 12/15/2017 1059      12/15/2017             Pulse: 66    SpO2: 100 %                Electronically Signed By: MAXIMILIANO Meza CRNA  December 15, 2017  10:59 AM

## 2017-12-17 ENCOUNTER — HEALTH MAINTENANCE LETTER (OUTPATIENT)
Age: 32
End: 2017-12-17

## 2017-12-19 LAB — COPATH REPORT: NORMAL

## 2018-04-30 ENCOUNTER — MYC MEDICAL ADVICE (OUTPATIENT)
Dept: OBGYN | Facility: CLINIC | Age: 33
End: 2018-04-30

## 2018-05-01 ENCOUNTER — TELEPHONE (OUTPATIENT)
Dept: OBGYN | Facility: CLINIC | Age: 33
End: 2018-05-01

## 2018-05-01 DIAGNOSIS — Z34.91 NORMAL PREGNANCY IN FIRST TRIMESTER: Primary | ICD-10-CM

## 2018-05-01 NOTE — TELEPHONE ENCOUNTER
Received callback from Jarrett Grover CNM, who recommended u/s and intake before their trip and NOB appointment when they return. Communicated this to pt via [employer].

## 2018-05-01 NOTE — TELEPHONE ENCOUNTER
Pt's LMP was 3/22/18 this is her 2nd pregnancy and she is currently experiencing breast tenderness. Pt is currently taking prenatal vitamins and is scheduled on 6/4/18 @ 8:00am.    Orders for dating ultrasound entered.

## 2018-05-01 NOTE — TELEPHONE ENCOUNTER
Forwarding mychart question to Megan Ramirez as pt has been identified with likely partial uterine septum.

## 2018-05-07 ENCOUNTER — OFFICE VISIT (OUTPATIENT)
Dept: OBGYN | Facility: CLINIC | Age: 33
End: 2018-05-07
Payer: COMMERCIAL

## 2018-05-07 VITALS
DIASTOLIC BLOOD PRESSURE: 80 MMHG | SYSTOLIC BLOOD PRESSURE: 124 MMHG | BODY MASS INDEX: 27.98 KG/M2 | HEART RATE: 76 BPM | WEIGHT: 163 LBS

## 2018-05-07 DIAGNOSIS — O21.9 NAUSEA/VOMITING IN PREGNANCY: Primary | ICD-10-CM

## 2018-05-07 DIAGNOSIS — Z34.91 NORMAL PREGNANCY IN FIRST TRIMESTER: ICD-10-CM

## 2018-05-07 DIAGNOSIS — Z34.90 EARLY STAGE OF PREGNANCY: ICD-10-CM

## 2018-05-07 PROCEDURE — G0463 HOSPITAL OUTPT CLINIC VISIT: HCPCS | Mod: 25,ZF

## 2018-05-07 PROCEDURE — 76817 TRANSVAGINAL US OBSTETRIC: CPT | Mod: ZF

## 2018-05-07 RX ORDER — ONDANSETRON 4 MG/1
4-8 TABLET, ORALLY DISINTEGRATING ORAL EVERY 8 HOURS PRN
Qty: 20 TABLET | Refills: 1 | Status: SHIPPED | OUTPATIENT
Start: 2018-05-07 | End: 2018-10-11

## 2018-05-07 ASSESSMENT — PAIN SCALES - GENERAL: PAINLEVEL: NO PAIN (0)

## 2018-05-07 NOTE — PROGRESS NOTES
"Chief Complaint:  Early Pregnancy vs Threatened AB    SUBJECTIVE: Aspen Gonzalez is a 33 year old female   who presents with her partner to review result of ultrasound.      Pt had dating ultra sound performed here in clinic today.  She is going out of town for a several week  trip leaving on Monday May 14th so she came in to have her dating US earlier than she had originally planned.  She had a MAB with D&C in 10/2017. The US today shows pregnancy earlier than expected ~5 weeks with small gestational sac. Patient is seen here  and informed of the results. Pt expresses concern but isn't surprised because her cycles are 32-34 days and wasn't expecting necessarily to have cardiac activity.  She has adequate support from  Masoud who is present and supportive.   The patient has been experiencing breast tenderness but no other presumptive signs of pregnancy. No VB or cramping.     She is traveling to [place], [place], [place] to visit their friend who is doing their masters in [place].  She does not drink alcohol so is not worried about possible risk exposure.     Review Of Systems   ROS: 10 point ROS neg other than the symptoms noted above in the HPI.    OBJECTIVE: Blood pressure 124/80, pulse 76, weight 73.9 kg (163 lb), not currently breastfeeding.    Physical Exam Deferred    A positive      ASSESSMENT:  -  Threatened AB vs Early pregnancy   - Rh positive    PLAN:   - Offered support, encouraged self care.  Offered coordination with mental health services prn.     - Reviewed taking medications in pregnancy - Vitamin B6 and Unisom for nausea if develops.  RX for Zofran prn given.  \"Safe Medications in Pregnancy\" written information given. Pt declined full OB Intake book/folder at this time     - Reviewed serial Quant Hcg before as option to give evidence of failing pregnancy vs early pregnancy. Reviewed that 48-72 hours would be recommended between blood draws and if noted decrease or " abnormal levels may need to recommend interventions that could delay travel possibly. Since pregnancy is intrauterine no need for Quants to rule out ectopic. Pt undecided if she will have bloods drawn. Future orders placed.     -  Reviewed how/why to call or present to the emergency room if she were to develop heavy bleeding saturating a maxi pad more frequently than every hour or passing large clots.   Reviewed how/why If she is bleeding longer than one week or it is heavy, recommended she follow-up for possible D&C. Pt instructed to call clinic if she develops a fever.  Encouraged pt to make plans with insurance for out of country medical care prn.     - Rhogam not indicated    Pt verbalized understanding of and agreement to plan of care.      100% of this 30 minute appointment was spent in face to face counseling and coordination care as above.    Pt and FOB verbalized understanding of   Megan VIERA CNM

## 2018-05-07 NOTE — MR AVS SNAPSHOT
After Visit Summary   5/7/2018    Aspen Gonzalez    MRN: [id number]           Patient Information     Date Of Birth          1985        Visit Information        Provider Department      5/7/2018 8:45 AM Megan Ramirez APRN CNM [facility]        Today's Diagnoses     Nausea/vomiting in pregnancy    -  1    Early stage of pregnancy           Follow-ups after your visit        Follow-up notes from your care team     Return in about 1 week (around 5/14/2018) for Follow up.      Who to contact     Please call your clinic at [phone] to:    Ask questions about your health    Make or cancel appointments    Discuss your medicines    Learn about your test results    Speak to your doctor            Additional Information About Your Visit        [employer] Information     [employer] gives you secure access to your electronic health record. If you see a primary care provider, you can also send messages to your care team and make appointments. If you have questions, please call your primary care clinic.  If you do not have a primary care provider, please call [phone] and they will assist you.      [employer]las is an electronic gateway that provides easy, online access to your medical records. With [employer], you can request a clinic appointment, read your test results, renew a prescription or communicate with your care team.     To access your existing account, please contact your [facility] or call [phone] for assistance.        Care EveryWhere ID     This is your Care EveryWhere ID. This could be used by other organizations to access your [place] medical records  [id number]        Your Vitals Were     Pulse BMI (Body Mass Index)                76 27.98 kg/m2           Blood Pressure from Last 3 Encounters:   05/07/18 124/80   12/15/17 110/66   12/08/17 124/78    Weight from Last 3 Encounters:   05/07/18 73.9 kg (163 lb)   12/15/17  75.7 kg (166 lb 14.2 oz)   12/08/17 75.8 kg (167 lb)                 Today's Medication Changes          These changes are accurate as of 5/7/18 11:59 PM.  If you have any questions, ask your nurse or doctor.               Start taking these medicines.        Dose/Directions    ondansetron 4 MG ODT tab   Commonly known as:  ZOFRAN ODT   Used for:  Nausea/vomiting in pregnancy   Started by:  Megan Ramirez APRN CNM        Dose:  4-8 mg   Take 1-2 tablets (4-8 mg) by mouth every 8 hours as needed for nausea   Quantity:  20 tablet   Refills:  1            Where to get your medicines      These medications were sent to [place] [address] [facility] - [place], [place] - [address]  [address] [facility] [address]     Phone:  [phone]     ondansetron 4 MG ODT tab                Primary Care Provider Office Phone # Fax #    Katerin Smith [phone]       [address] [place] [address]        Equal Access to Services     [facility]: Hadii aad ku hadasho Soomaali, waaxda luqadaha, qaybta kaalmada adeegyamagui, crissy ruiz . So [facility] [phone].    ATENCIÓN: Si habla español, tiene a shafer disposición servicios gratuitos de asistencia lingüística. [facility] [phone].    We comply with applicable federal civil rights laws and [place] laws. We do not discriminate on the basis of race, color, national origin, age, disability, sex, sexual orientation, or gender identity.            Thank you!     Thank you for choosing [facility]  for your care. Our goal is always to provide you with excellent care. Hearing back from our patients is one way we can continue to improve our services. Please take a few minutes to complete the written survey that you may receive in the mail after your visit with us. Thank you!             Your Updated Medication List - Protect others around you: Learn how to safely use, store and throw away your  medicines at www.disposemymeds.org.          This list is accurate as of 5/7/18 11:59 PM.  Always use your most recent med list.                   Brand Name Dispense Instructions for use Diagnosis    ondansetron 4 MG ODT tab    ZOFRAN ODT    20 tablet    Take 1-2 tablets (4-8 mg) by mouth every 8 hours as needed for nausea    Nausea/vomiting in pregnancy       PRENATAL VITAMINS PO

## 2018-05-07 NOTE — PROGRESS NOTES
33 year old female, , with LMP 2018, 6 4/7 weeks. Estimated Date of Delivery: 2018,  presents for confirmation of dates and assessment of viability. This study was done transvaginally.    Measurements     GS = 2.9 mm = 5 0/7 weeks  EGA.   JESSIE = 2019.     Small gestational sac, too early to see fetal pole or yolk sac.     Fetal/Fetal Cardiac Activity: Absent.       Implantation: Intrauterine.     Cervix = long, closed       Maternal structures appear normal.    Impression: Unable to determine if early viable IUP or missed ab.  Recommend follow up study in 11-14 days.  Serial beta hcg levels are planned as the pt is traveling out of the country in a few days.        MEGHANA Madera MD, FACOG

## 2018-05-07 NOTE — MR AVS SNAPSHOT
After Visit Summary   5/7/2018    Aspen Gonzalez    MRN: [id number]           Patient Information     Date Of Birth          1985        Visit Information        Provider Department      5/7/2018 8:00 AM [facility] ULTRASOUND [facility]        Today's Diagnoses     Normal pregnancy in first trimester           Follow-ups after your visit        Your next 10 appointments already scheduled     Jun 01, 2018  1:30 PM CDT   ULTRASOUND with [facility] ULTRASOUND   [facility] ([facility])    [facility] [address]  [address]  [facility] [address] [phone]            Jun 01, 2018  2:15 PM CDT   NEW OB with MAXIMILIANO Solis CNM   [facility] ([facility])    [facility] [address]  [address]  [facility] [address] [phone]              Future tests that were ordered for you today     Open Standing Orders        Priority Remaining Interval Expires Ordered    HCG quantitative pregnancy Routine 2/3 3 days 5/7/2019 5/7/2018          Open Future Orders        Priority Expected Expires Ordered    HCG quantitative pregnancy Routine  5/8/2019 5/8/2018    US OB <14 Weeks w Transvaginal Single Routine 5/21/2018 5/7/2019 5/7/2018            Who to contact     Please call your clinic at [phone] to:    Ask questions about your health    Make or cancel appointments    Discuss your medicines    Learn about your test results    Speak to your doctor            Additional Information About Your Visit        [employer] Information     [employer] gives you secure access to your electronic health record. If you see a primary care provider, you can also send messages to your care team and make appointments. If you have questions, please call your primary care clinic.  If you do not have a primary care provider, please call [phone] and they will assist  you.      [employer] is an electronic gateway that provides easy, online access to your medical records. With [employer], you can request a clinic appointment, read your test results, renew a prescription or communicate with your care team.     To access your existing account, please contact your [facility] or call [phone] for assistance.        Care EveryWhere ID     This is your Care EveryWhere ID. This could be used by other organizations to access your [place] medical records  [id number]         Blood Pressure from Last 3 Encounters:   05/07/18 124/80   12/15/17 110/66   12/08/17 124/78    Weight from Last 3 Encounters:   05/07/18 73.9 kg (163 lb)   12/15/17 75.7 kg (166 lb 14.2 oz)   12/08/17 75.8 kg (167 lb)              We Performed the Following     Dating ultrasound less than 14 weeks gestation Transvag  - 37025 (In Clinic)          Today's Medication Changes          These changes are accurate as of 5/7/18 11:59 PM.  If you have any questions, ask your nurse or doctor.               Start taking these medicines.        Dose/Directions    ondansetron 4 MG ODT tab   Commonly known as:  ZOFRAN ODT   Used for:  Nausea/vomiting in pregnancy   Started by:  Megan Ramirez APRN CNM        Dose:  4-8 mg   Take 1-2 tablets (4-8 mg) by mouth every 8 hours as needed for nausea   Quantity:  20 tablet   Refills:  1            Where to get your medicines      These medications were sent to [place] [address] [facility] - [place], [place] - [address]  [address] [facility] [address]     Phone:  [phone]     ondansetron 4 MG ODT tab                Primary Care Provider Office Phone # Fax #    Katerin Smith [phone]       [address] [facility] [address] [place] [place] [address]        Equal Access to Services     GLYNN PIERCE AH: Julien perez Soana, waaxda luqadaha, qaybta kaalmada curtis, crissy corbin. So [facility]  [phone].    ATENCIÓN: Si micki cannon, tiene a shafer disposición servicios gratuitos de asistencia lingüística. Horacio al [phone].    We comply with applicable federal civil rights laws and [place] laws. We do not discriminate on the basis of race, color, national origin, age, disability, sex, sexual orientation, or gender identity.            Thank you!     Thank you for choosing [facility]  for your care. Our goal is always to provide you with excellent care. Hearing back from our patients is one way we can continue to improve our services. Please take a few minutes to complete the written survey that you may receive in the mail after your visit with us. Thank you!             Your Updated Medication List - Protect others around you: Learn how to safely use, store and throw away your medicines at www.disposemymeds.org.          This list is accurate as of 5/7/18 11:59 PM.  Always use your most recent med list.                   Brand Name Dispense Instructions for use Diagnosis    ondansetron 4 MG ODT tab    ZOFRAN ODT    20 tablet    Take 1-2 tablets (4-8 mg) by mouth every 8 hours as needed for nausea    Nausea/vomiting in pregnancy       PRENATAL VITAMINS PO

## 2018-05-08 ENCOUNTER — TELEPHONE (OUTPATIENT)
Dept: OBGYN | Facility: CLINIC | Age: 33
End: 2018-05-08

## 2018-05-08 DIAGNOSIS — O20.9 FIRST TRIMESTER BLEEDING: Primary | ICD-10-CM

## 2018-05-08 DIAGNOSIS — Z34.90 EARLY STAGE OF PREGNANCY: ICD-10-CM

## 2018-05-08 LAB — B-HCG SERPL-ACNC: 7 IU/L (ref 0–5)

## 2018-05-08 NOTE — TELEPHONE ENCOUNTER
Dallin calling to report she had some vaginal spotting last night,dark red/brown in color with no pain. Slept thru the night.   Today had episode of bright red bleeding with quarter size clots and some low back pain. She put a maxi pad on and went to work. Bleeding hasn't increased.  Reviewed when to go to ED.   She was seen in clinic yesterday ,early pregnancy and US done d/t her hx of miscarriage and leaving next Monday 5/14/18 for  trip for several weeks.    She is Rh positive blood type.  Megan already ordered b-HCG so Dallin will go today to get lab test and again in 48 hrs.  B-HCG ordered for Thursday.Pt indicated understanding and agreed with plan.

## 2018-05-09 ENCOUNTER — TELEPHONE (OUTPATIENT)
Dept: OBGYN | Facility: CLINIC | Age: 33
End: 2018-05-09

## 2018-05-09 NOTE — TELEPHONE ENCOUNTER
Left voicemail for pt to call back regarding her hcg levels.  Routed to RN staff so that they can page me when she returns call to discuss.    ADDENDUM - pt followed up with phone call.  Breast tenderness stopped. Bleeding started Monday night with cramping.   Has been bleeding since.  Reviewed Hcg 7 is clinically indicative that she has most likely passed pregnancy.    Pt may have second Hcg drawn this week to see but it may take time to fall below zero.  Recommended continued pad use instead of tampons at this time.  Encouraged pt to follow up with MD team before next pregnancy to rule out other causes of SAB and consider baby ASA or progesterone therapy prn.  Pt appropriately sad about second miscarriage and agreeable to follow up before next pregnancy.   Reviewed warning s/s and how/why to contact CNM prn or access emergency services.   All pt's questions discussed and answered. Pt verbalized understanding of and agreement to plan of care.  Megan VIERA, NYLAM

## 2018-10-11 ENCOUNTER — RADIANT APPOINTMENT (OUTPATIENT)
Dept: GENERAL RADIOLOGY | Facility: CLINIC | Age: 33
End: 2018-10-11
Attending: FAMILY MEDICINE
Payer: COMMERCIAL

## 2018-10-11 ENCOUNTER — OFFICE VISIT (OUTPATIENT)
Dept: FAMILY MEDICINE | Facility: CLINIC | Age: 33
End: 2018-10-11
Payer: COMMERCIAL

## 2018-10-11 VITALS
OXYGEN SATURATION: 96 % | HEIGHT: 64 IN | DIASTOLIC BLOOD PRESSURE: 76 MMHG | HEART RATE: 68 BPM | BODY MASS INDEX: 28.85 KG/M2 | SYSTOLIC BLOOD PRESSURE: 115 MMHG | WEIGHT: 169 LBS

## 2018-10-11 DIAGNOSIS — M79.671 RIGHT FOOT PAIN: Primary | ICD-10-CM

## 2018-10-11 PROCEDURE — 99213 OFFICE O/P EST LOW 20 MIN: CPT | Performed by: FAMILY MEDICINE

## 2018-10-11 PROCEDURE — 73630 X-RAY EXAM OF FOOT: CPT | Mod: RT

## 2018-10-11 NOTE — MR AVS SNAPSHOT
After Visit Summary   10/11/2018    Aspen Gonzalez    MRN: [id number]           Patient Information     Date Of Birth          1985        Visit Information        Provider Department      10/11/2018 9:00 AM Katerin Smith MD [facility]        Today's Diagnoses     Right foot pain    -  1      Care Instructions      R.I.C.E.    R.I.C.E. stands for Rest, Ice, Compression, and Elevation. Doing these things helps limit pain and swelling after an injury. R.I.C.E. also helps injuries heal faster. Use R.I.C.E. for sprains, strains, and severe bruises or bumps. Follow the tips on this handout and begin R.I.C.E. as soon as possible after an injury.  ? Rest  Pain is your body s way of telling you to rest an injured area. Whether you have hurt an elbow, hand, foot, or knee, limiting its use will prevent further injury and help you heal.  ? Ice  Applying ice right after an injury helps prevent swelling and reduce pain. Don t place ice directly on your skin.    Wrap a cold pack or bag of ice in a thin cloth. Place it over the injured area.    Ice for 10 minutes every 3 hours. Don t ice for more than 20 minutes at a time.  ? Compression  Putting pressure (compression) on an injury helps prevent swelling and provides support.    Wrap the injured area firmly with an elastic bandage. If your hand or foot tingles, becomes discolored, or feels cold to the touch, the bandage may be too tight. Rewrap it more loosely.    If your bandage becomes too loose, rewrap it.    Do not wear an elastic bandage overnight.  ? Elevation  Keeping an injury elevated helps reduce swelling, pain, and throbbing. Elevation is most effective when the injury is kept elevated higher than the heart.     Call your healthcare provider if you notice any of the following:    Fingers or toes feel numb, are cold to the touch, or change color    Skin looks shiny or tight    Pain, swelling, or bruising worsens and is not  improved with elevation   Date Last Reviewed: 9/3/2015    9211-3727 [employer]. [address] [place] [address], [place] [address]. All rights reserved. This information is not intended as a substitute for professional medical care. Always follow your healthcare professional's instructions.                Follow-ups after your visit        Follow-up notes from your care team     Return for Physical Exam with a Pap at earliest convenience.      Your next 10 appointments already scheduled     Oct 11, 2018  9:00 AM CDT   Cristal New Injury with Katerin Smith MD   [facility] ([facility]    [address]  [place] [id number] [phone]              Who to contact     Normal or non-critical lab and imaging results will be communicated to you by [employer], letter or phone within 4 business days after the clinic has received the results. If you do not hear from us within 7 days, please contact the clinic through [employer] or phone. If you have a critical or abnormal lab result, we will notify you by phone as soon as possible.  Submit refill requests through [employer] or call your pharmacy and they will forward the refill request to us. Please allow 3 business days for your refill to be completed.          If you need to speak with a  for additional information , please call: [phone]             Additional Information About Your Visit        [employer] Information     [employer] gives you secure access to your electronic health record. If you see a primary care provider, you can also send messages to your care team and make appointments. If you have questions, please call your primary care clinic.  If you do not have a primary care provider, please call [phone] and they will assist you.        Care EveryWhere ID     This is your Care EveryWhere ID. This could be used by other organizations to access your [place] medical records  [id number]        Your  "Vitals Were     Pulse Height Pulse Oximetry BMI (Body Mass Index)          68 5' 4\" (1.626 m) 96% 29.01 kg/m2         Blood Pressure from Last 3 Encounters:   10/11/18 115/76   05/07/18 124/80   12/15/17 110/66    Weight from Last 3 Encounters:   10/11/18 169 lb (76.7 kg)   05/07/18 163 lb (73.9 kg)   12/15/17 166 lb 14.2 oz (75.7 kg)              We Performed the Following     XR Foot Right G/E 3 Views        Primary Care Provider Office Phone # Fax #    Katerin Smith [phone]       [address] [place] [address]        Equal Access to Services     [facility] KARI : Hadii brent ku hadasho Soomaali, waaxda luqadaha, qaybta kaalmada adeegyada, waxay robin hayhumbetron yesika ruiz . So [facility] [phone].    ATENCIÓN: Si habla español, tiene a shafer disposición servicios gratuitos de asistencia lingüística. [facility] [phone].    We comply with applicable federal civil rights laws and [place] laws. We do not discriminate on the basis of race, color, national origin, age, disability, sex, sexual orientation, or gender identity.            Thank you!     Thank you for choosing [facility]  for your care. Our goal is always to provide you with excellent care. Hearing back from our patients is one way we can continue to improve our services. Please take a few minutes to complete the written survey that you may receive in the mail after your visit with us. Thank you!             Your Updated Medication List - Protect others around you: Learn how to safely use, store and throw away your medicines at www.disposemymeds.org.      Notice  As of 10/11/2018  8:49 AM    You have not been prescribed any medications.      "

## 2018-10-11 NOTE — PATIENT INSTRUCTIONS
R.I.C.E.    R.I.C.E. stands for Rest, Ice, Compression, and Elevation. Doing these things helps limit pain and swelling after an injury. R.I.C.E. also helps injuries heal faster. Use R.I.C.E. for sprains, strains, and severe bruises or bumps. Follow the tips on this handout and begin R.I.C.E. as soon as possible after an injury.  ? Rest  Pain is your body s way of telling you to rest an injured area. Whether you have hurt an elbow, hand, foot, or knee, limiting its use will prevent further injury and help you heal.  ? Ice  Applying ice right after an injury helps prevent swelling and reduce pain. Don t place ice directly on your skin.    Wrap a cold pack or bag of ice in a thin cloth. Place it over the injured area.    Ice for 10 minutes every 3 hours. Don t ice for more than 20 minutes at a time.  ? Compression  Putting pressure (compression) on an injury helps prevent swelling and provides support.    Wrap the injured area firmly with an elastic bandage. If your hand or foot tingles, becomes discolored, or feels cold to the touch, the bandage may be too tight. Rewrap it more loosely.    If your bandage becomes too loose, rewrap it.    Do not wear an elastic bandage overnight.  ? Elevation  Keeping an injury elevated helps reduce swelling, pain, and throbbing. Elevation is most effective when the injury is kept elevated higher than the heart.     Call your healthcare provider if you notice any of the following:    Fingers or toes feel numb, are cold to the touch, or change color    Skin looks shiny or tight    Pain, swelling, or bruising worsens and is not improved with elevation   Date Last Reviewed: 9/3/2015    5952-0552 [employer] [address] [place] [address], [place] [address]. All rights reserved. This information is not intended as a substitute for professional medical care. Always follow your healthcare professional's instructions.

## 2018-10-11 NOTE — PROGRESS NOTES
SUBJECTIVE:   Aspen Gonzalez is a 33 year old female who presents to clinic today for the following health issues:      Joint Pain    Onset: x1 month; has been training for a 20 mile marathon    Description:   Location: Outer side of right foot and right big toe  Character: throbbing    Intensity: mild, 3/10    Progression of Symptoms: worse, constant    Accompanying Signs & Symptoms:  Other symptoms: none    History:   Previous similar pain: no       Precipitating factors:   Trauma or overuse: YES- overuse    Alleviating factors:  Improved by: nothing    Therapies Tried and outcome: ibuprofen--no relief.    Completed the 20 mile marathon over the weekend.     Problem list and histories reviewed & adjusted, as indicated.  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     Past Surgical History:   Procedure Laterality Date     DILATION AND CURETTAGE SUCTION N/A 10/20/2017    Procedure: DILATION AND CURETTAGE SUCTION;  Suction Dilation and Curettage ;  Surgeon: Kathrin Donnelly MD;  Location: [facility] OR      TOOTH EXTRACTION W/FORCEP       OPERATIVE HYSTEROSCOPY N/A 12/15/2017    Procedure: OPERATIVE HYSTEROSCOPY;  Operative Hysteroscopy, Resection of Polyp;  Surgeon: Shalini Nicohle MD;  Location: [facility] OR       Social History   Substance Use Topics     Smoking status: Never Smoker     Smokeless tobacco: Never Used     Alcohol use No     Family History   Problem Relation Age of Onset     Breast Cancer No family hx of      Cancer - colorectal No family hx of      Prostate Cancer No family hx of      C.A.D. No family hx of      Diabetes No family hx of      Bleeding Disorder No family hx of      Anesthesia Reaction No family hx of          No current outpatient prescriptions on file.     Allergies   Allergen  "Reactions     Penicillins        Reviewed and updated as needed this visit by clinical staff       Reviewed and updated as needed this visit by Provider         ROS:  Constitutional, HEENT, cardiovascular, pulmonary, gi and gu systems are negative, except as otherwise noted.    OBJECTIVE:     /76  Pulse 68  Ht 5' 4\" (1.626 m)  Wt 169 lb (76.7 kg)  SpO2 96%  BMI 29.01 kg/m2  Body mass index is 29.01 kg/(m^2).  GENERAL: healthy, alert and no distress  MS: no gross musculoskeletal defects noted, no edema  RIGHT FOOT: normal appearing without any gross abnormalities. Tenderness on the mid lateral aspect of the foot without any overlying skin changes. Pedal pulse present, no cyanosis. No tenderness of the plantar fascia.    Diagnostic Test Results:  X ray in process    ASSESSMENT/PLAN:   Aspen was seen today for foot pain.    Diagnoses and all orders for this visit:    Right foot pain- strain versus stress fracture  -     XR Foot Right G/E 3 Views  In the interim recommended, RICE therapy + OTC NSAIDs.       Will notify her with X ray results.     Schedule a Physical Exam with a Pap at earliest convenience.       Katerin Smith MD  [facility]  "

## 2019-04-01 ENCOUNTER — OFFICE VISIT (OUTPATIENT)
Dept: FAMILY MEDICINE | Facility: CLINIC | Age: 34
End: 2019-04-01
Payer: COMMERCIAL

## 2019-04-01 VITALS
OXYGEN SATURATION: 97 % | RESPIRATION RATE: 18 BRPM | HEART RATE: 78 BPM | HEIGHT: 64 IN | BODY MASS INDEX: 29.37 KG/M2 | DIASTOLIC BLOOD PRESSURE: 76 MMHG | TEMPERATURE: 98.2 F | SYSTOLIC BLOOD PRESSURE: 123 MMHG | WEIGHT: 172 LBS

## 2019-04-01 DIAGNOSIS — N20.0 NEPHROLITHIASIS: ICD-10-CM

## 2019-04-01 DIAGNOSIS — Z13.1 SCREENING FOR DIABETES MELLITUS: ICD-10-CM

## 2019-04-01 DIAGNOSIS — Z00.00 ROUTINE GENERAL MEDICAL EXAMINATION AT A HEALTH CARE FACILITY: Primary | ICD-10-CM

## 2019-04-01 DIAGNOSIS — Z13.220 LIPID SCREENING: ICD-10-CM

## 2019-04-01 DIAGNOSIS — Z12.4 CERVICAL CANCER SCREENING: ICD-10-CM

## 2019-04-01 DIAGNOSIS — M19.071 DEGENERATIVE ARTHRITIS OF TOE JOINT, RIGHT: ICD-10-CM

## 2019-04-01 LAB
ALBUMIN UR-MCNC: NEGATIVE MG/DL
APPEARANCE UR: CLEAR
BACTERIA #/AREA URNS HPF: ABNORMAL /HPF
BILIRUB UR QL STRIP: NEGATIVE
COLOR UR AUTO: YELLOW
GLUCOSE UR STRIP-MCNC: NEGATIVE MG/DL
HGB UR QL STRIP: ABNORMAL
KETONES UR STRIP-MCNC: NEGATIVE MG/DL
LEUKOCYTE ESTERASE UR QL STRIP: NEGATIVE
NITRATE UR QL: NEGATIVE
NON-SQ EPI CELLS #/AREA URNS LPF: ABNORMAL /LPF
PH UR STRIP: 7 PH (ref 5–7)
RBC #/AREA URNS AUTO: ABNORMAL /HPF
SOURCE: ABNORMAL
SP GR UR STRIP: 1.01 (ref 1–1.03)
UROBILINOGEN UR STRIP-ACNC: 0.2 EU/DL (ref 0.2–1)
WBC #/AREA URNS AUTO: ABNORMAL /HPF

## 2019-04-01 PROCEDURE — 81001 URINALYSIS AUTO W/SCOPE: CPT | Performed by: FAMILY MEDICINE

## 2019-04-01 PROCEDURE — G0145 SCR C/V CYTO,THINLAYER,RESCR: HCPCS | Performed by: FAMILY MEDICINE

## 2019-04-01 PROCEDURE — 87624 HPV HI-RISK TYP POOLED RSLT: CPT | Performed by: FAMILY MEDICINE

## 2019-04-01 PROCEDURE — 99213 OFFICE O/P EST LOW 20 MIN: CPT | Mod: 25 | Performed by: FAMILY MEDICINE

## 2019-04-01 PROCEDURE — 99395 PREV VISIT EST AGE 18-39: CPT | Performed by: FAMILY MEDICINE

## 2019-04-01 RX ORDER — DICLOFENAC SODIUM 75 MG/1
75 TABLET, DELAYED RELEASE ORAL 2 TIMES DAILY PRN
Qty: 30 TABLET | Refills: 0 | Status: SHIPPED | OUTPATIENT
Start: 2019-04-01 | End: 2020-01-28

## 2019-04-01 ASSESSMENT — MIFFLIN-ST. JEOR: SCORE: 1480.19

## 2019-04-01 NOTE — PROGRESS NOTES
SUBJECTIVE:   CC: Ronnlkalyan Gonzalez is an 33 year old woman who presents for preventive health visit.     Healthy Habits:    Do you get at least three servings of calcium containing foods daily (dairy, green leafy vegetables, etc.)? yes    Amount of exercise or daily activities, outside of work: 5 day(s) per week    Problems taking medications regularly No    Medication side effects: No    Have you had an eye exam in the past two years? yes    Do you see a dentist twice per year? yes    Do you have sleep apnea, excessive snoring or daytime drowsiness?no      ED/UC Followup:    Facility: [facility]  Date of visit: 3/19/19  Reason for visit: flank pain, kidney stones   Current Status: improvement of symptoms after passing the stone     Care everywhere obtained and reviewed.   CT abdo/pelvis reviewed- noted tiny bilateral non-obstructing intrarenal calculi    Reports persistent right big toe pain. X rays done in the past revealed degenerative changes. States that it's now constantly in pain and limiting her walking or work-out routines. Takes OTC NSAIDs periodically for pain relief- ineffective.     HEALTH CARE MAINTENANCE: due for a Pap test.     Patient informed that anything we discuss that is not related to preventative medicine, may be billed for; patient verbalizes understanding.      Today's PHQ-2 Score:   PHQ-2 ( 1999 [employer]) 4/1/2019 10/11/2018   Q1: Little interest or pleasure in doing things 0 0   Q2: Feeling down, depressed or hopeless 0 0   PHQ-2 Score 0 0       Abuse: Current or Past(Physical, Sexual or Emotional)- No  Do you feel safe in your environment? Yes    Social History     Tobacco Use     Smoking status: Never Smoker     Smokeless tobacco: Never Used   Substance Use Topics     Alcohol use: No     If you drink alcohol do you typically have >3 drinks per day or >7 drinks per week? No                     Reviewed orders with patient.  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     Past Surgical History:   Procedure Laterality Date     DILATION AND CURETTAGE SUCTION N/A 10/20/2017    Procedure: DILATION AND CURETTAGE SUCTION;  Suction Dilation and Curettage ;  Surgeon: Kathrin Donnelly MD;  Location: [facility] OR     HC TOOTH EXTRACTION W/FORCEP       OPERATIVE HYSTEROSCOPY N/A 12/15/2017    Procedure: OPERATIVE HYSTEROSCOPY;  Operative Hysteroscopy, Resection of Polyp;  Surgeon: Shalini Nichole MD;  Location: [facility] OR       Social History     Tobacco Use     Smoking status: Never Smoker     Smokeless tobacco: Never Used   Substance Use Topics     Alcohol use: No     Family History   Problem Relation Age of Onset     Breast Cancer No family hx of      Cancer - colorectal No family hx of      Prostate Cancer No family hx of      C.A.D. No family hx of      Diabetes No family hx of      Bleeding Disorder No family hx of      Anesthesia Reaction No family hx of          Current Outpatient Medications   Medication Sig Dispense Refill     diclofenac (VOLTAREN) 75 MG EC tablet Take 1 tablet (75 mg) by mouth 2 times daily as needed for moderate pain (take with food) 30 tablet 0     Allergies   Allergen Reactions     Penicillins        Mammogram not appropriate for this patient based on age.    Pertinent mammograms are reviewed under the imaging tab.  History of abnormal Pap smear: NO - age 30- 65 PAP every 3 years recommended     Reviewed and updated as needed this visit by clinical staff  Tobacco  Allergies  Meds  Med Hx  Surg Hx  Fam Hx  Soc  "Hx        Reviewed and updated as needed this visit by Provider  Tobacco  Med Hx  Surg Hx  Fam Hx  Soc Hx           ROS:  CONSTITUTIONAL: NEGATIVE for fever, chills, change in weight  INTEGUMENTARU/SKIN: NEGATIVE for worrisome rashes, moles or lesions  EYES: NEGATIVE for vision changes or irritation  ENT: NEGATIVE for ear, mouth and throat problems  RESP: NEGATIVE for significant cough or SOB  BREAST: NEGATIVE for masses, tenderness or discharge  CV: NEGATIVE for chest pain, palpitations or peripheral edema  GI: NEGATIVE for nausea, abdominal pain, heartburn, or change in bowel habits  : NEGATIVE for unusual urinary or vaginal symptoms. Periods are regular.  MUSCULOSKELETAL: NEGATIVE for significant arthralgias or myalgia  NEURO: NEGATIVE for weakness, dizziness or paresthesias  PSYCHIATRIC: NEGATIVE for changes in mood or affect    OBJECTIVE:   /76   Pulse 78   Temp 98.2  F (36.8  C) (Tympanic)   Resp 18   Ht 1.626 m (5' 4\")   Wt 78 kg (172 lb)   LMP 03/27/2019 (Exact Date)   SpO2 97%   Breastfeeding? No   BMI 29.52 kg/m    EXAM:  GENERAL: healthy, alert and no distress  EYES: Eyes grossly normal to inspection, PERRL and conjunctivae and sclerae normal  HENT: ear canals and TM's normal, nose and mouth without ulcers or lesions  NECK: no adenopathy, no asymmetry, masses, or scars and thyroid normal to palpation  RESP: lungs clear to auscultation - no rales, rhonchi or wheezes  BREAST: normal without masses, tenderness or nipple discharge and no palpable axillary masses or adenopathy  CV: regular rate and rhythm, normal S1 S2, no S3 or S4, no murmur, click or rub, no peripheral edema and peripheral pulses strong  ABDOMEN: soft, nontender, no hepatosplenomegaly, no masses and bowel sounds normal   (female): normal female external genitalia, normal urethral meatus, vaginal mucosa pink, moist, well rugated, and normal cervix/adnexa/uterus without masses or discharge  MS: no gross musculoskeletal " defects noted, no edema  SKIN: no suspicious lesions or rashes  NEURO: Normal strength and tone, mentation intact and speech normal  PSYCH: mentation appears normal, affect normal/bright    Diagnostic Test Results:    Previous Foot X ray reviewed.   XR FOOT RT G/E 3 VW 10/11/2018 9:01 AM     COMPARISON: None.     HISTORY: RIGHT foot pain.                                                                      IMPRESSION: Mild RIGHT first metatarsophalangeal degenerative change  with preserved joint space. No fractures are seen in the RIGHT foot.     LUIZ SEGURA MD    Reviewed and discussed with patient prior to discharge.  Results for orders placed or performed in visit on 04/01/19   UA reflex to Microscopic and Culture   Result Value Ref Range    Color Urine Yellow     Appearance Urine Clear     Glucose Urine Negative NEG^Negative mg/dL    Bilirubin Urine Negative NEG^Negative    Ketones Urine Negative NEG^Negative mg/dL    Specific Gravity Urine 1.015 1.003 - 1.035    Blood Urine Trace (A) NEG^Negative    pH Urine 7.0 5.0 - 7.0 pH    Protein Albumin Urine Negative NEG^Negative mg/dL    Urobilinogen Urine 0.2 0.2 - 1.0 EU/dL    Nitrite Urine Negative NEG^Negative    Leukocyte Esterase Urine Negative NEG^Negative    Source Midstream Urine    Urine Microscopic   Result Value Ref Range    WBC Urine 0 - 5 OTO5^0 - 5 /HPF    RBC Urine O - 2 OTO2^O - 2 /HPF    Squamous Epithelial /LPF Urine Few FEW^Few /LPF    Bacteria Urine Few (A) NEG^Negative /HPF       See orders below    ASSESSMENT/PLAN:   Diagnoses and all orders for this visit:    Routine general medical examination at a health care facility    Cervical cancer screening  -     Pap imaged thin layer screen with HPV - recommended age 30 - 65 years (select HPV order below)  -     HPV High Risk Types DNA Cervical    Lipid screening  -     Lipid Profile; Future    Screening for diabetes mellitus  -     Glucose; Future    Nephrolithiasis- passed stone; now  "asymptomatic  -     UA reflex to Microscopic and Culture  -     Urine Microscopic  Encouraged to increase fluid intake      Degenerative arthritis of toe joint, right  -     diclofenac (VOLTAREN) 75 MG EC tablet; Take 1 tablet (75 mg) by mouth 2 times daily as needed for moderate pain (take with food)  -     PODIATRY/FOOT & ANKLE SURGERY REFERRAL        COUNSELING:   Reviewed preventive health counseling, as reflected in patient instructions       Regular exercise       Healthy diet/nutrition    BP Readings from Last 1 Encounters:   04/01/19 123/76     Estimated body mass index is 29.52 kg/m  as calculated from the following:    Height as of this encounter: 1.626 m (5' 4\").    Weight as of this encounter: 78 kg (172 lb).    BP Screening:   Last 3 BP Readings:    BP Readings from Last 3 Encounters:   04/01/19 123/76   10/11/18 115/76   05/07/18 124/80       The following was recommended to the patient:  Re-screen BP within a year and recommended lifestyle modifications  Weight management plan: Discussed healthy diet and exercise guidelines     reports that  has never smoked. she has never used smokeless tobacco.      Counseling Resources:  ATP IV Guidelines  Pooled Cohorts Equation Calculator  Breast Cancer Risk Calculator  FRAX Risk Assessment  ICSI Preventive Guidelines  Dietary Guidelines for Americans, 2010  USDA's MyPlate  ASA Prophylaxis  Lung CA Screening    Follow up annually and as needed thoughout the year.    Katerin Smith MD  [facility] JAIME  "

## 2019-04-01 NOTE — PATIENT INSTRUCTIONS
Preventive Health Recommendations  Female Ages 26 - 39  Yearly exam:   See your health care provider every year in order to    Review health changes.     Discuss preventive care.      Review your medicines if you your doctor has prescribed any.    Until age 30: Get a Pap test every three years (more often if you have had an abnormal result).    After age 30: Talk to your doctor about whether you should have a Pap test every 3 years or have a Pap test with HPV screening every 5 years.   You do not need a Pap test if your uterus was removed (hysterectomy) and you have not had cancer.  You should be tested each year for STDs (sexually transmitted diseases), if you're at risk.   Talk to your provider about how often to have your cholesterol checked.  If you are at risk for diabetes, you should have a diabetes test (fasting glucose).  Shots: Get a flu shot each year. Get a tetanus shot every 10 years.   Nutrition:     Eat at least 5 servings of fruits and vegetables each day.    Eat whole-grain bread, whole-wheat pasta and brown rice instead of white grains and rice.    Get adequate Calcium and Vitamin D.     Lifestyle    Exercise at least 150 minutes a week (30 minutes a day, 5 days of the week). This will help you control your weight and prevent disease.    Limit alcohol to one drink per day.    No smoking.     Wear sunscreen to prevent skin cancer.    See your dentist every six months for an exam and cleaning.    Patient Education     Preventing Kidney Stones  If you ve had a kidney stone, you may worry that you ll have another. Removing or passing your stone doesn t prevent future stones. But with your healthcare provider s help, you can reduce your risk of forming new stones. Follow up with your healthcare provider to help find new stones. You may need follow-up every 3 months to a year for a lifetime.    Drink lots of water  Staying well-hydrated is the best way to reduce your risk of future stones. Drink  8 12-ounce glasses of water daily. Have 2 with each meal and 2 between meals. Try keeping a pitcher of water nearby during the day and at night.  Take medicines if needed  Medicines, including vitamins and minerals, may be prescribed for certain types of stones. You may want to write your doses and medicine times on a calendar. Some medicines decrease stone-forming chemicals in your blood. Others help prevent those chemicals from crystallizing in urine. Still others help keep a normal acid balance in your urine.  Diet   You should limit the amount of salt in your food to about 2 grams a day. This will help prevent most types of kidney stones. Make sure you get an adequate amount of calcium in your diet.  For calcium oxalate stones: Limit animal protein, such as meat, eggs, and fish. Limit grapefruit juice and alcohol. Limit high-oxalate foods (such as cola, tea, chocolate, spinach, rhubarb, wheat bran, and peanuts).  For uric acid stones: Limit high-purine foods, such as mushrooms, peas, beans, anchovies, meat, poultry, shellfish, and organ meats. These foods increase uric acid production.  For cystine stones: Limit high-methionine foods (fish is the most common, but eggs and meats, also). These foods increase production of cystine.  Date Last Reviewed: 2/1/2017 2000-2018 [employer]. [address] [place] [address], [place] [address]. All rights reserved. This information is not intended as a substitute for professional medical care. Always follow your healthcare professional's instructions.

## 2019-04-02 DIAGNOSIS — Z13.1 SCREENING FOR DIABETES MELLITUS: ICD-10-CM

## 2019-04-02 DIAGNOSIS — Z13.220 LIPID SCREENING: ICD-10-CM

## 2019-04-02 LAB
CHOLEST SERPL-MCNC: 191 MG/DL
GLUCOSE SERPL-MCNC: 79 MG/DL (ref 70–99)
HDLC SERPL-MCNC: 54 MG/DL
LDLC SERPL CALC-MCNC: 120 MG/DL
NONHDLC SERPL-MCNC: 137 MG/DL
TRIGL SERPL-MCNC: 87 MG/DL

## 2019-04-02 PROCEDURE — 82947 ASSAY GLUCOSE BLOOD QUANT: CPT | Performed by: FAMILY MEDICINE

## 2019-04-02 PROCEDURE — 36415 COLL VENOUS BLD VENIPUNCTURE: CPT | Performed by: FAMILY MEDICINE

## 2019-04-02 PROCEDURE — 80061 LIPID PANEL: CPT | Performed by: FAMILY MEDICINE

## 2019-04-04 LAB
COPATH REPORT: NORMAL
PAP: NORMAL

## 2019-04-05 LAB
FINAL DIAGNOSIS: NORMAL
HPV HR 12 DNA CVX QL NAA+PROBE: NEGATIVE
HPV16 DNA SPEC QL NAA+PROBE: NEGATIVE
HPV18 DNA SPEC QL NAA+PROBE: NEGATIVE
SPECIMEN DESCRIPTION: NORMAL
SPECIMEN SOURCE CVX/VAG CYTO: NORMAL

## 2019-08-12 ENCOUNTER — TELEPHONE (OUTPATIENT)
Dept: FAMILY MEDICINE | Facility: CLINIC | Age: 34
End: 2019-08-12

## 2019-08-12 NOTE — TELEPHONE ENCOUNTER
Patient is calling stating was seen in spring and would like to get referred information for podiatry. Caller informed that calls received after 3pm may not be returned same day.  Please call to discuss. Thank you.

## 2019-08-29 ENCOUNTER — OFFICE VISIT (OUTPATIENT)
Dept: PODIATRY | Facility: CLINIC | Age: 34
End: 2019-08-29
Payer: COMMERCIAL

## 2019-08-29 VITALS
BODY MASS INDEX: 30.9 KG/M2 | DIASTOLIC BLOOD PRESSURE: 78 MMHG | HEART RATE: 96 BPM | SYSTOLIC BLOOD PRESSURE: 113 MMHG | WEIGHT: 180 LBS

## 2019-08-29 DIAGNOSIS — M20.5X1 HALLUX LIMITUS OF RIGHT FOOT: Primary | ICD-10-CM

## 2019-08-29 PROBLEM — Z87.59 MISCARRIAGE WITHIN LAST 12 MONTHS: Status: ACTIVE | Noted: 2018-05-07

## 2019-08-29 PROCEDURE — 99243 OFF/OP CNSLTJ NEW/EST LOW 30: CPT | Performed by: PODIATRIST

## 2019-08-29 NOTE — PATIENT INSTRUCTIONS
Weight management plan: Patient was referred to their PCP to discuss a diet and exercise plan.  We wish you continued good healing. If you have any questions or concerns, please do not hesitate to contact us at [phone]    Please remember to call and schedule a follow up appointment if one was recommended at your earliest convenience.   PODIATRY CLINIC HOURS  TELEPHONE NUMBER    Dr. Golden Cuba D.P.M [facility]    Clinics:  [facility]    [facility]   Tuesday 1PM-6PM  [place]  Wednesday 7AM-2PM  [facility]  Thursday 10AM-6PM  [place]  Friday 7AM-3PM  [place]  Specialty schedulers:   [phone] to make an appointment with any Specialty Provider.        Urgent Care locations:    [facility] Monday-Friday 5 pm - 9 pm. Saturday-Sunday 9 am -5pm    Monday-Friday 11 am - 9 pm Saturday 9 am - 5 pm     Monday-Sunday 12 noon-8PM [phone]     If you need a medication refill, please contact us you may need lab work and/or a follow up visit prior to your refill (i.e. Antifungal medications).    "[employer] (secure e-mail communication and access to your chart) to send a message or to make an appointment.    If MRI needed please call [place] Camara at [phone]

## 2019-08-29 NOTE — LETTER
2019         RE: Aspen Gonzalez  [address]  [place] [address]        Dear Colleague,    Thank you for referring your patient, Aspen Gonzalez, to the [facility]. Please see a copy of my visit note below.     Subjective:    Pt is seen today in consult form Dr. Smith with the c/c of painful right foot.  This has been symptomatic for the past 10 months.  Points to dorsum of first metatarsal phalangeal joint.  Has pain w/ ambulation and shoewear and is relieved by rest.  This started after a 20 mile run.  It was painful with every step until more recently.  Now it only bothers her if she is doing lunges.  She has not been running.  She has gotten some stiff or sandals which has helped.  Denies pain in the contralateral foot.  Has tried NSAIDS,  and changing shoewear around w/o much success.  Patient denies any history of trauma here.  She states she has been doing a lot of running in the past.    ROS:  A 10-point review of systems was performed and is positive for that noted in the HPI and as seen above.  All other areas are negative.          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     Miscarriage within last 12 months       Past Medical History:   Diagnosis Date     History of blood transfusion      History of carbon monoxide poisoning     age 7, hospitalized x 2 days     Kidney stones     non-obstructing      Septate uterus - possible noted on OB intake US 10/16/2017       Past Surgical History:   Procedure Laterality Date      DILATION AND CURETTAGE SUCTION N/A 10/20/2017    Procedure: DILATION AND CURETTAGE SUCTION;  Suction Dilation and Curettage ;  Surgeon: Kathrin Donnelly MD;  [facility] OR     HC TOOTH EXTRACTION W/FORCEP       OPERATIVE HYSTEROSCOPY N/A 12/15/2017    Procedure: OPERATIVE HYSTEROSCOPY;  Operative Hysteroscopy, Resection of Polyp;  Surgeon: Shalini Nichole MD;  [facility] OR       Family History   Problem Relation Age of Onset     Breast Cancer No family hx of      Cancer - colorectal No family hx of      Prostate Cancer No family hx of      C.A.D. No family hx of      Diabetes No family hx of      Bleeding Disorder No family hx of      Anesthesia Reaction No family hx of        Social History     Tobacco Use     Smoking status: Never Smoker     Smokeless tobacco: Never Used   Substance Use Topics     Alcohol use: No         Exam:    Vitals: /78 (BP Location: Left arm)   Pulse 96   Wt 81.6 kg (180 lb)   BMI 30.90 kg/m     BMI: Body mass index is 30.9 kg/m .  Height: Data Unavailable    Constitutional/ general:  Pt is in no apparent distress, appears well-nourished.  Cooperative with history and physical exam.     Psych:  The patient answered questions appropriately.  Normal affect.  Seems to have reasonable expectations, in terms of treatment.     Eyes:  Visual scanning/ tracking without deficit.     Ears:  Response to auditory stimuli is normal.  negative hearing aid devices.  Auricles in proper alignment.     Lymphatic:  Popliteal lymph nodes not enlarged.     Lungs:  Non labored breathing, non labored speech. No cough.  No audible wheezing. Even, quiet breathing.       Vascular:  positive pedal pulses bilaterally for both the DP and PT arteries.  CFT < 3 sec.  negative ankle edema.  positive pedal hair growth.    Neuro:  Alert and oriented x 3. Coordinated gait.  Light touch sensation is intact to the L4, L5, S1 distributions. No obvious deficits.  No evidence of neurological-based  weakness, spasticity, or contracture in the lower extremities.      Derm: Normal texture and turgor.  No erythema, ecchymosis, or cyanosis.      Musculoskeletal:    Lower extremity muscle strength is normal.  Patient is ambulatory without an assistive device or brace.  No gross deformities.  pronated arch.  Normal ROM all forefoot and rearfoot joints except bilateral 1st MTPJ.  The left first MTPJ can be dorsiflexed approximately 60 degrees in the right we can only dorsiflex 30 degrees.  Patient has pain with range of motion on the right.  Hernan proliferation dorsally bilateral more so on the right than the left.  No echymosis, edema, signs of infection or trauma. No open lesions, increased warmth or ascending cellulitis.  X-ray right foot three views foot shows only very slight joint space narrowing of 1st mtpj, subcondral schlerosis, and a dorsal flag.  No other fractures/pathology noted.      Assessment:  Hallux Limitus right foot early stage II                         Hallux limitus left foot stage I    Plan:  X-rays from past personally reviewed.  Explained to patient how her pronation can lead to hallux limitus.  To prevent her left side from getting worse we discussed good arch support for her foot.  She will call if she would like to try orthotics.  She will also retain the range of motion that she has there and we instructed on range of motion exercises.   For her right foot recommended wearing a stiff soled shoes and made suggestions on both indoor and outdoor shoes.  She will avoid activities that bother this.  I made suggestions on a rocker-bottom running shoes to see if this helps..  Also discussed surgical options if conservative treatment fails.  Next step in treating this would be a cheilectomy and we discussed the surgical procedure with her.  RTC prn.  Thank you for allowing me participate in the care of this patient.        Golden Cuba, NITHYA DPM, FACFAS       Again, thank you for allowing me  to participate in the care of your patient.        Sincerely,        Golden Cuba DPM

## 2019-08-29 NOTE — PROGRESS NOTES
Subjective:    Pt is seen today in consult form Dr. Smith with the c/c of painful right foot.  This has been symptomatic for the past 10 months.  Points to dorsum of first metatarsal phalangeal joint.  Has pain w/ ambulation and shoewear and is relieved by rest.  This started after a 20 mile run.  It was painful with every step until more recently.  Now it only bothers her if she is doing lunges.  She has not been running.  She has gotten some stiff or sandals which has helped.  Denies pain in the contralateral foot.  Has tried NSAIDS,  and changing shoewear around w/o much success.  Patient denies any history of trauma here.  She states she has been doing a lot of running in the past.    ROS:  A 10-point review of systems was performed and is positive for that noted in the HPI and as seen above.  All other areas are negative.          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     Miscarriage within last 12 months       Past Medical History:   Diagnosis Date     History of blood transfusion      History of carbon monoxide poisoning     age 7, hospitalized x 2 days     Kidney stones     non-obstructing      Septate uterus - possible noted on OB intake US 10/16/2017       Past Surgical History:   Procedure Laterality Date     DILATION AND CURETTAGE SUCTION N/A 10/20/2017    Procedure: DILATION AND CURETTAGE SUCTION;  Suction Dilation and Curettage ;  Surgeon: Kathrin Donnelly MD;  [facility]      TOOTH EXTRACTION W/FORCEP       OPERATIVE HYSTEROSCOPY N/A 12/15/2017    Procedure:  OPERATIVE HYSTEROSCOPY;  Operative Hysteroscopy, Resection of Polyp;  Surgeon: Shalini Nichole MD;  [facility]       Family History   Problem Relation Age of Onset     Breast Cancer No family hx of      Cancer - colorectal No family hx of      Prostate Cancer No family hx of      C.A.D. No family hx of      Diabetes No family hx of      Bleeding Disorder No family hx of      Anesthesia Reaction No family hx of        Social History     Tobacco Use     Smoking status: Never Smoker     Smokeless tobacco: Never Used   Substance Use Topics     Alcohol use: No         Exam:    Vitals: /78 (BP Location: Left arm)   Pulse 96   Wt 81.6 kg (180 lb)   BMI 30.90 kg/m    BMI: Body mass index is 30.9 kg/m .  Height: Data Unavailable    Constitutional/ general:  Pt is in no apparent distress, appears well-nourished.  Cooperative with history and physical exam.     Psych:  The patient answered questions appropriately.  Normal affect.  Seems to have reasonable expectations, in terms of treatment.     Eyes:  Visual scanning/ tracking without deficit.     Ears:  Response to auditory stimuli is normal.  negative hearing aid devices.  Auricles in proper alignment.     Lymphatic:  Popliteal lymph nodes not enlarged.     Lungs:  Non labored breathing, non labored speech. No cough.  No audible wheezing. Even, quiet breathing.       Vascular:  positive pedal pulses bilaterally for both the DP and PT arteries.  CFT < 3 sec.  negative ankle edema.  positive pedal hair growth.    Neuro:  Alert and oriented x 3. Coordinated gait.  Light touch sensation is intact to the L4, L5, S1 distributions. No obvious deficits.  No evidence of neurological-based weakness, spasticity, or contracture in the lower extremities.      Derm: Normal texture and turgor.  No erythema, ecchymosis, or cyanosis.      Musculoskeletal:    Lower extremity muscle strength is normal.  Patient is ambulatory without an assistive device or brace.  No  gross deformities.  pronated arch.  Normal ROM all forefoot and rearfoot joints except bilateral 1st MTPJ.  The left first MTPJ can be dorsiflexed approximately 60 degrees in the right we can only dorsiflex 30 degrees.  Patient has pain with range of motion on the right.  Hernan proliferation dorsally bilateral more so on the right than the left.  No echymosis, edema, signs of infection or trauma. No open lesions, increased warmth or ascending cellulitis.  X-ray right foot three views foot shows only very slight joint space narrowing of 1st mtpj, subcondral schlerosis, and a dorsal flag.  No other fractures/pathology noted.      Assessment:  Hallux Limitus right foot early stage II                         Hallux limitus left foot stage I    Plan:  X-rays from past personally reviewed.  Explained to patient how her pronation can lead to hallux limitus.  To prevent her left side from getting worse we discussed good arch support for her foot.  She will call if she would like to try orthotics.  She will also retain the range of motion that she has there and we instructed on range of motion exercises.   For her right foot recommended wearing a stiff soled shoes and made suggestions on both indoor and outdoor shoes.  She will avoid activities that bother this.  I made suggestions on a rocker-bottom running shoes to see if this helps..  Also discussed surgical options if conservative treatment fails.  Next step in treating this would be a cheilectomy and we discussed the surgical procedure with her.  RTC prn.  Thank you for allowing me participate in the care of this patient.        Golden Cuba, NITHYA BARRIGA, FACFAS

## 2019-10-28 DIAGNOSIS — L70.9 ACNE, UNSPECIFIED ACNE TYPE: Primary | ICD-10-CM

## 2020-01-21 ENCOUNTER — TRANSFERRED RECORDS (OUTPATIENT)
Dept: HEALTH INFORMATION MANAGEMENT | Facility: CLINIC | Age: 35
End: 2020-01-21

## 2020-01-28 ENCOUNTER — OFFICE VISIT (OUTPATIENT)
Dept: FAMILY MEDICINE | Facility: CLINIC | Age: 35
End: 2020-01-28
Payer: COMMERCIAL

## 2020-01-28 VITALS
OXYGEN SATURATION: 99 % | WEIGHT: 182 LBS | SYSTOLIC BLOOD PRESSURE: 112 MMHG | BODY MASS INDEX: 31.24 KG/M2 | DIASTOLIC BLOOD PRESSURE: 75 MMHG | RESPIRATION RATE: 16 BRPM | HEART RATE: 84 BPM | TEMPERATURE: 97.3 F

## 2020-01-28 DIAGNOSIS — N20.0 RECURRENT NEPHROLITHIASIS: ICD-10-CM

## 2020-01-28 DIAGNOSIS — J01.90 ACUTE SINUSITIS WITH SYMPTOMS > 10 DAYS: Primary | ICD-10-CM

## 2020-01-28 PROCEDURE — 99214 OFFICE O/P EST MOD 30 MIN: CPT | Performed by: FAMILY MEDICINE

## 2020-01-28 RX ORDER — FLUTICASONE PROPIONATE 50 MCG
2 SPRAY, SUSPENSION (ML) NASAL DAILY
Qty: 16 G | Refills: 0 | Status: SHIPPED | OUTPATIENT
Start: 2020-01-28 | End: 2023-02-01

## 2020-01-28 RX ORDER — TAMSULOSIN HYDROCHLORIDE 0.4 MG/1
0.4 CAPSULE ORAL DAILY
COMMUNITY
End: 2020-01-28

## 2020-01-28 RX ORDER — DOXYCYCLINE 100 MG/1
100 CAPSULE ORAL 2 TIMES DAILY
Qty: 20 CAPSULE | Refills: 0 | Status: SHIPPED | OUTPATIENT
Start: 2020-01-28 | End: 2020-02-07

## 2020-01-28 NOTE — PATIENT INSTRUCTIONS
Patient Education     Sinusitis (Antibiotic Treatment)    The sinuses are air-filled spaces within the bones of the face. They connect to the inside of the nose. Sinusitis is an inflammation of the tissue that lines the sinuses. Sinusitis can occur during a cold. It can also happen due to allergies to pollens and other particles in the air. Sinusitis can cause symptoms of sinus congestion and a feeling of fullness. A sinus infection causes fever, headache, and facial pain. There is often green or yellow fluid draining from the nose or into the back of the throat (post-nasal drip). You have been given antibiotics to treat this condition.  Home care    Take the full course of antibiotics as instructed. Do not stop taking them, even when you feel better.    Drink plenty of water, hot tea, and other liquids. This may help thin nasal mucus. It also may help your sinuses drain fluids.    Heat may help soothe painful areas of your face. Use a towel soaked in hot water. Or,  the shower and direct the warm spray onto your face. Using a vaporizer along with a menthol rub at night may also help soothe symptoms.     An expectorant with guaifenesin may help thin nasal mucus and help your sinuses drain fluids.    You can use an over-the-counter decongestant, unless a similar medicine was prescribed to you. Nasal sprays work the fastest. Use one that contains phenylephrine or oxymetazoline. First blow your nose gently. Then use the spray. Do not use these medicines more often than directed on the label. If you do, your symptoms may get worse. You may also take pills that contain pseudoephedrine. Don t use products that combine multiple medicines. This is because side effects may be increased. Read labels. You can also ask the pharmacist for help. (People with high blood pressure should not use decongestants. They can raise blood pressure.)    Over-the-counter antihistamines may help if allergies contributed to your  sinusitis.      Do not use nasal rinses or irrigation during an acute sinus infection, unless your healthcare provider tells you to. Rinsing may spread the infection to other areas in your sinuses.    Use acetaminophen or ibuprofen to control pain, unless another pain medicine was prescribed to you. If you have chronic liver or kidney disease or ever had a stomach ulcer, talk with your healthcare provider before using these medicines. (Aspirin should never be taken by anyone under age 18 who is ill with a fever. It may cause severe liver damage.)    Don't smoke. This can make symptoms worse.  Follow-up care  Follow up with your healthcare provider or our staff if you are not better in 1 week.  When to seek medical advice  Call your healthcare provider if any of these occur:    Facial pain or headache that gets worse    Stiff neck    Unusual drowsiness or confusion    Swelling of your forehead or eyelids    Vision problems, such as blurred or double vision    Fever of 100.4 F (38 C) or higher, or as directed by your healthcare provider    Seizure    Breathing problems    Symptoms don't go away in 10 days  Prevention  Here are steps you can take to help prevent an infection:    Keep good hand washing habits.    Don t have close contact with people who have sore throats, colds, or other upper respiratory infections.    Don t smoke, and stay away from secondhand smoke.    Stay up to date with of your vaccines.  Date Last Reviewed: 11/1/2017 2000-2019 [employer]. [address] [place] [address], [place] [address]. All rights reserved. This information is not intended as a substitute for professional medical care. Always follow your healthcare professional's instructions.

## 2020-01-28 NOTE — PROGRESS NOTES
Subjective     Caolfionn Sarah Gonzalez is a 34 year old female who presents to clinic today for the following health issues:        Chief Complaint   Patient presents with     Sinus Problem     Kidney Stone Related     recheck passed stone last week       HPI   RESPIRATORY SYMPTOMS      Duration: x 2 weeks    Description  nasal congestion, sore throat, facial pain/pressure, cough, wheezing, chills, ear pain left, headache and fatigue/malaise- persistent symptoms.     Severity: severe    Accompanying signs and symptoms: None    History (predisposing factors):  [facility] was told she might have flu B- was not tested.    Precipitating or alleviating factors: None    Therapies tried and outcome:  Ibuprofen 600mg      PROBLEM TO ADD:     ED/UC Followup:    Facility:  [facility]  Date of visit: 2020  Reason for visit: renal colic due to kidney stone- passed kidney stone prior to discharge.  Current Status: Improved. Pain resolved     Had a CT abdomen pelvis on day of admission on -revealed obstructive distal right ureteral calculus measuring 2 mm with mild upstream hydro-utero nephrosis.  No right intrarenal calculi.  Stable clustered nonobstructive left intrarenal calculi largest measuring 4 mm.    Renal and bladder ultrasound done the following day revealed mild right hydronephrosis appearing decreased compared with prior CT.    Was discharged on Flomax; pain subsided after she passed a kidney stone.      Patient reports a prior history of a kidney stone and states that this is her second episode.  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     Miscarriage within last 12 months     Past Surgical History:   Procedure Laterality Date     DILATION AND CURETTAGE SUCTION N/A  10/20/2017    Procedure: DILATION AND CURETTAGE SUCTION;  Suction Dilation and Curettage ;  Surgeon: Kathrin Donnelly MD;  [facility]     HC TOOTH EXTRACTION W/FORCEP       OPERATIVE HYSTEROSCOPY N/A 12/15/2017    Procedure: OPERATIVE HYSTEROSCOPY;  Operative Hysteroscopy, Resection of Polyp;  Surgeon: Shalini Nichole MD;  [facility]       Social History     Tobacco Use     Smoking status: Never Smoker     Smokeless tobacco: Never Used   Substance Use Topics     Alcohol use: No     Family History   Problem Relation Age of Onset     Breast Cancer No family hx of      Cancer - colorectal No family hx of      Prostate Cancer No family hx of      C.A.D. No family hx of      Diabetes No family hx of      Bleeding Disorder No family hx of      Anesthesia Reaction No family hx of          Current Outpatient Medications   Medication Sig Dispense Refill     doxycycline hyclate (VIBRAMYCIN) 100 MG capsule Take 1 capsule (100 mg) by mouth 2 times daily for 10 days 20 capsule 0     fluticasone (FLONASE) 50 MCG/ACT nasal spray Spray 2 sprays into both nostrils daily 16 g 0     Allergies   Allergen Reactions     Penicillins          Reviewed and updated as needed this visit by Provider         Review of Systems   ROS COMP: Constitutional, HEENT, cardiovascular, pulmonary, gi and gu systems are negative, except as otherwise noted.      Objective    /75   Pulse 84   Temp 97.3  F (36.3  C) (Tympanic)   Resp 16   Wt 82.6 kg (182 lb)   SpO2 99%   BMI 31.24 kg/m    Body mass index is 31.24 kg/m .  Physical Exam   GENERAL: healthy, alert and no distress  EYES: Eyes grossly normal to inspection, PERRL and conjunctivae and sclerae normal  HENT: ear canals and TM's normal, bilateral nasal turbinate hypertrophy. Mouth without ulcers or lesions  RESP: lungs clear to auscultation - no rales, rhonchi or wheezes  CV: regular rate and rhythm, normal S1 S2, no S3 or S4, no murmur, click or rub, no peripheral  edema and peripheral pulses strong  ABDOMEN: soft, nontender, no hepatosplenomegaly, no masses and bowel sounds normal  PSYCH: mentation appears normal, affect normal/bright    Diagnostic Test Results:  Labs reviewed in Epic        Assessment & Plan     Aspen was seen today for sinus problem and kidney stone related.    Diagnoses and all orders for this visit:    Acute sinusitis with symptoms > 10 days  -     doxycycline hyclate (VIBRAMYCIN) 100 MG capsule; Take 1 capsule (100 mg) by mouth 2 times daily for 10 days  -     fluticasone (FLONASE) 50 MCG/ACT nasal spray; Spray 2 sprays into both nostrils daily    Recurrent nephrolithiasis; currently asymptomatic after passing kidney stone      - Discontinue the Flomax      - Follow up with Urology as scheduled on 2/4/2020-scheduled at an outside facility ([facility])      Patient education and Handout with home care instructions given. See AVS for details.      Return in about 1 week (around 2/4/2020), or if symptoms worsen or fail to improve.    Katerin Smith MD  [facility]

## 2020-08-20 ENCOUNTER — VIRTUAL VISIT (OUTPATIENT)
Dept: FAMILY MEDICINE | Facility: OTHER | Age: 35
End: 2020-08-20
Payer: COMMERCIAL

## 2020-08-20 DIAGNOSIS — Z20.822 SUSPECTED 2019 NOVEL CORONAVIRUS INFECTION: Primary | ICD-10-CM

## 2020-08-20 DIAGNOSIS — Z20.822 SUSPECTED 2019 NOVEL CORONAVIRUS INFECTION: ICD-10-CM

## 2020-08-20 PROCEDURE — U0003 INFECTIOUS AGENT DETECTION BY NUCLEIC ACID (DNA OR RNA); SEVERE ACUTE RESPIRATORY SYNDROME CORONAVIRUS 2 (SARS-COV-2) (CORONAVIRUS DISEASE [COVID-19]), AMPLIFIED PROBE TECHNIQUE, MAKING USE OF HIGH THROUGHPUT TECHNOLOGIES AS DESCRIBED BY CMS-2020-01-R: HCPCS | Performed by: FAMILY MEDICINE

## 2020-08-20 PROCEDURE — 99421 OL DIG E/M SVC 5-10 MIN: CPT | Performed by: NURSE PRACTITIONER

## 2020-08-20 NOTE — PROGRESS NOTES
"Date: 2020 06:55:43  Clinician: Jie Mcclure  Clinician NPI: [id number]  Patient: Aspen Gaston  Patient : 1985  Patient Address: [address] [place] [address] [place] [address]  Patient Phone: [phone]  Visit Protocol: URI  Patient Summary:  Aspen is a 35 year old ( : 1985 ) female who initiated a Visit for COVID-19 (Coronavirus) evaluation and screening. When asked the question \"Please sign me up to receive news, health information and promotions from [facility].\", Aspen responded \"Yes\".    Aspen states her symptoms started 1-2 days ago.   Her symptoms consist of chills, malaise, enlarged lymph nodes, a sore throat, and myalgia.   Symptom details   Sore throat: Aspen reports having moderate throat pain (4-6 on a 10 point pain scale), does not have exudate on her tonsils, and can swallow liquids. The lymph nodes in her neck are enlarged. A rash has not appeared on the skin since the sore throat started.    Aspen denies having ear pain, headache, fever, wheezing, teeth pain, ageusia, diarrhea, cough, nasal congestion, vomiting, rhinitis, nausea, anosmia, and facial pain or pressure. She also denies having recent facial or sinus surgery in the past 60 days and taking antibiotic medication in the past month. She is not experiencing dyspnea.   Precipitating events  Within the past week, Aspen has not been exposed to someone with strep throat. She has not recently been exposed to someone with influenza. Aspen has been in close contact with the following high risk individuals: immunocompromised people.   Pertinent COVID-19 (Coronavirus) information  In the past 14 days, Aspen has not worked in a congregate living setting.   She does not work or volunteer as healthcare worker or a  and does not work or volunteer in a healthcare facility.   Aspen also has not lived in a congregate living setting in the past 14 days. She does not live " with a healthcare worker.   Aspen has not had a close contact with a laboratory-confirmed COVID-19 patient within 14 days of symptom onset.   Since December 2019, Aspen and has had upper respiratory infection (URI) or influenza-like illness. Has not been diagnosed with lab-confirmed COVID-19 test      Date(s) of previous URI or influenza-like illness (free-text): Dec 2019 January 2020     Symptoms Aspen experienced during previous URI or influenza-like illness as reported by the patient (free-text): Sore throat, cough, aches        Pertinent medical history  Aspen does not get yeast infections when she takes antibiotics.   Aspen does not need a return to work/school note.   Weight: 182 lbs   Aspen does not smoke or use smokeless tobacco.   She denies pregnancy and denies breastfeeding. She is currently menstruating.   Weight: 182 lbs    MEDICATIONS: No current medications, ALLERGIES: Penicillins  Clinician Response:  Dear Aspen,   Your symptoms show that you may have coronavirus (COVID-19). This illness can cause fever, cough and trouble breathing. Many people get a mild case and get better on their own. Some people can get very sick.  What should I do?  We would like to test you for this virus.   1. Please call [phone] to schedule your visit. Explain that you were referred by [facility] to have a COVID-19 test. Be ready to share your [facility] number.  The following will serve as your written order for this COVID Test, ordered by me, for the indication of suspected COVID [Z20.828]: The test will be ordered in [employer], our electronic health record, after you are scheduled. It will show as ordered and authorized by Mariano Botello MD.  Order: COVID-19 (Coronavirus) PCR for SYMPTOMATIC testing from [facility].      2. When it's time for your COVID test:  Stay at least 6 feet away from others. (If someone will drive you to your test, stay in the backseat, as far away from the  as you can.)  "  Cover your mouth and nose with a mask, tissue or washcloth.  Go straight to the testing site. Don't make any stops on the way there or back.      3.Starting now: Stay home and away from others (self-isolate) until:   You've had no fever---and no medicine that reduces fever---for one full day (24 hours). And...   Your other symptoms have gotten better. For example, your cough or breathing has improved. And...   At least 10 days have passed since your symptoms started.       During this time, don't leave the house except for testing or medical care.   Stay in your own room, even for meals. Use your own bathroom if you can.   Stay away from others in your home. No hugging, kissing or shaking hands. No visitors.  Don't go to work, school or anywhere else.    Clean \"high touch\" surfaces often (doorknobs, counters, handles, etc.). Use a household cleaning spray or wipes. You'll find a full list of  on the EPA website: www.epa.gov/pesticide-registration/list-n-disinfectants-use-against-sars-cov-2.   Cover your mouth and nose with a mask, tissue or washcloth to avoid spreading germs.  Wash your hands and face often. Use soap and water.  Caregivers in these groups are at risk for severe illness due to COVID-19:  o People 65 years and older  o People who live in a nursing home or long-term care facility  o People with chronic disease (lung, heart, cancer, diabetes, kidney, liver, immunologic)  o People who have a weakened immune system, including those who:   Are in cancer treatment  Take medicine that weakens the immune system, such as corticosteroids  Had a bone marrow or organ transplant  Have an immune deficiency  Have poorly controlled HIV or AIDS  Are obese (body mass index of 40 or higher)  Smoke regularly   o Caregivers should wear gloves while washing dishes, handling laundry and cleaning bedrooms and bathrooms.  o Use caution when washing and drying laundry: Don't shake dirty laundry, and use the warmest " water setting that you can.  o For more tips, go to www.cdc.gov/coronavirus/2019-ncov/downloads/10Things.pdf.    4.Sign up for [employer]. We know it's scary to hear that you might have COVID-19. We want to track your symptoms to make sure you're okay over the next 2 weeks. Please look for an email from [employer]---this is a free, online program that we'll use to keep in touch. To sign up, follow the link in the email. Learn more at [email or web address]  How can I take care of myself?   Get lots of rest. Drink extra fluids (unless a doctor has told you not to).   Take Tylenol (acetaminophen) for fever or pain. If you have liver or kidney problems, ask your family doctor if it's okay to take Tylenol.   Adults can take either:    650 mg (two 325 mg pills) every 4 to 6 hours, or...   1,000 mg (two 500 mg pills) every 8 hours as needed.    Note: Don't take more than 3,000 mg in one day. Acetaminophen is found in many medicines (both prescribed and over-the-counter medicines). Read all labels to be sure you don't take too much.   For children, check the Tylenol bottle for the right dose. The dose is based on the child's age or weight.    If you have other health problems (like cancer, heart failure, an organ transplant or severe kidney disease): Call your specialty clinic if you don't feel better in the next 2 days.       Know when to call 911. Emergency warning signs include:    Trouble breathing or shortness of breath Pain or pressure in the chest that doesn't go away Feeling confused like you haven't felt before, or not being able to wake up Bluish-colored lips or face.  Where can I get more information?    [facility] -- About COVID-19: [email or web address]   CDC -- What to Do If You're Sick: www.cdc.gov/coronavirus/2019-ncov/about/steps-when-sick.html   CDC -- Ending Home Isolation: www.cdc.gov/coronavirus/2019-ncov/hcp/disposition-in-home-patients.html   [facility] -- Caring for Someone:  www.cdc.gov/coronavirus/2019-ncov/if-you-are-sick/care-for-someone.html   [facility] -- Interim Guidance for Hospital Discharge to Home: www.health.[facility].[place]/diseases/coronavirus/hcp/hospdischarge.pdf   [facility] clinical trials (COVID-19 research studies): clinicalaffairs.[facility]-clinical-trials    Below are the COVID-19 hotlines at the [place] Department of Health ([facility]). Interpreters are available.    For health questions: Call 120-953-6628 or 1-639.277.2107 (7 a.m. to 7 p.m.) For questions about schools and childcare: Call 217-047-0131 or 1-569.692.6985 (7 a.m. to 7 p.m.)    Diagnosis: Acute pharyngitis, unspecified  Diagnosis ICD: J02.9

## 2020-08-21 LAB
SARS-COV-2 RNA SPEC QL NAA+PROBE: NOT DETECTED
SPECIMEN SOURCE: NORMAL

## 2020-12-20 ENCOUNTER — HEALTH MAINTENANCE LETTER (OUTPATIENT)
Age: 35
End: 2020-12-20

## 2021-10-03 ENCOUNTER — HEALTH MAINTENANCE LETTER (OUTPATIENT)
Age: 36
End: 2021-10-03

## 2022-01-23 ENCOUNTER — HEALTH MAINTENANCE LETTER (OUTPATIENT)
Age: 37
End: 2022-01-23

## 2022-01-26 ENCOUNTER — OFFICE VISIT (OUTPATIENT)
Dept: FAMILY MEDICINE | Facility: CLINIC | Age: 37
End: 2022-01-26
Payer: COMMERCIAL

## 2022-01-26 VITALS
OXYGEN SATURATION: 96 % | BODY MASS INDEX: 28.92 KG/M2 | WEIGHT: 169.38 LBS | RESPIRATION RATE: 12 BRPM | HEIGHT: 64 IN | TEMPERATURE: 97.8 F | HEART RATE: 78 BPM | SYSTOLIC BLOOD PRESSURE: 118 MMHG | DIASTOLIC BLOOD PRESSURE: 82 MMHG

## 2022-01-26 DIAGNOSIS — Z11.59 NEED FOR HEPATITIS C SCREENING TEST: ICD-10-CM

## 2022-01-26 DIAGNOSIS — Z11.4 SCREENING FOR HIV (HUMAN IMMUNODEFICIENCY VIRUS): ICD-10-CM

## 2022-01-26 DIAGNOSIS — Z13.220 LIPID SCREENING: ICD-10-CM

## 2022-01-26 DIAGNOSIS — M25.512 LEFT SHOULDER PAIN, UNSPECIFIED CHRONICITY: ICD-10-CM

## 2022-01-26 DIAGNOSIS — Z00.00 ROUTINE GENERAL MEDICAL EXAMINATION AT A HEALTH CARE FACILITY: Primary | ICD-10-CM

## 2022-01-26 DIAGNOSIS — Z13.1 SCREENING FOR DIABETES MELLITUS: ICD-10-CM

## 2022-01-26 PROCEDURE — 99395 PREV VISIT EST AGE 18-39: CPT | Performed by: FAMILY MEDICINE

## 2022-01-26 ASSESSMENT — ENCOUNTER SYMPTOMS
ARTHRALGIAS: 0
HEMATURIA: 0
SORE THROAT: 0
FREQUENCY: 0
DYSURIA: 0
CONSTIPATION: 0
ABDOMINAL PAIN: 0
HEADACHES: 0
PARESTHESIAS: 0
JOINT SWELLING: 0
DIARRHEA: 0
MYALGIAS: 0
HEMATOCHEZIA: 0
FEVER: 0
PALPITATIONS: 0
BREAST MASS: 0
DIZZINESS: 0
HEARTBURN: 0
CHILLS: 0
EYE PAIN: 0
NERVOUS/ANXIOUS: 0
SHORTNESS OF BREATH: 0
WEAKNESS: 0
NAUSEA: 0
COUGH: 0

## 2022-01-26 ASSESSMENT — PAIN SCALES - GENERAL: PAINLEVEL: NO PAIN (0)

## 2022-01-26 ASSESSMENT — MIFFLIN-ST. JEOR: SCORE: 1453.28

## 2022-01-26 NOTE — PROGRESS NOTES
SUBJECTIVE:   CC: Aspen COPELAND Sarah Gonzalez is an 36 year old woman who presents for preventive health visit.       Patient has been advised of split billing requirements and indicates understanding: Yes  Healthy Habits:     Getting at least 3 servings of Calcium per day:  Yes    Bi-annual eye exam:  Yes    Dental care twice a year:  Yes    Sleep apnea or symptoms of sleep apnea:  None    Diet:  Regular (no restrictions)    Frequency of exercise:  4-5 days/week    Duration of exercise:  45-60 minutes    Taking medications regularly:  Yes    Medication side effects:  Not applicable    PHQ-2 Total Score: 0    Additional concerns today:  Yes      PROBLEMS TO ADD ON...  Reports that about a month ago she dislocated her left shoulder while she was over reaching to help with a new puppy.  Reports that the shoulder snapped back in position and has been improving since then. Pain is tolerable. No analgesics needed.   She is right handed. Reports no upper extremity weakness or limitations in activity.    HEALTH CARE MAINTENANCE:     Today's PHQ-2 Score:   PHQ-2 ( 1999 [employer]) 1/26/2022   Q1: Little interest or pleasure in doing things 0   Q2: Feeling down, depressed or hopeless 0   PHQ-2 Score 0   PHQ-2 Total Score (12-17 Years)- Positive if 3 or more points; Administer PHQ-A if positive -   Q1: Little interest or pleasure in doing things Not at all   Q2: Feeling down, depressed or hopeless Not at all   PHQ-2 Score 0       Abuse: Current or Past (Physical, Sexual or Emotional) - No  Do you feel safe in your environment? Yes    Have you ever done Advance Care Planning? (For example, a Health Directive, POLST, or a discussion with a medical provider or your loved ones about your wishes): No, advance care planning information given to patient to review.  Patient plans to discuss their wishes with loved ones or provider.      Social History     Tobacco Use     Smoking status: Never Smoker     Smokeless tobacco: Never Used    Substance Use Topics     Alcohol use: No         Alcohol Use 2022   Prescreen: >3 drinks/day or >7 drinks/week? Not Applicable   Prescreen: >3 drinks/day or >7 drinks/week? -       Reviewed orders with patient.  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     Miscarriage within last 12 months     Past Surgical History:   Procedure Laterality Date     DILATION AND CURETTAGE SUCTION N/A 10/20/2017    Procedure: DILATION AND CURETTAGE SUCTION;  Suction Dilation and Curettage ;  Surgeon: Kathrin Donnelly MD;  [facility]     HC TOOTH EXTRACTION W/FORCEP       OPERATIVE HYSTEROSCOPY N/A 12/15/2017    Procedure: OPERATIVE HYSTEROSCOPY;  Operative Hysteroscopy, Resection of Polyp;  Surgeon: Shalini Nichole MD;  [facility]       Social History     Tobacco Use     Smoking status: Never Smoker     Smokeless tobacco: Never Used   Substance Use Topics     Alcohol use: No     Family History   Problem Relation Age of Onset     Breast Cancer No family hx of      Cancer - colorectal No family hx of      Prostate Cancer No family hx of      C.A.D. No family hx of      Diabetes No family hx of      Bleeding Disorder No family hx of      Anesthesia Reaction No family hx of          Current Outpatient Medications   Medication Sig Dispense Refill     fluticasone (FLONASE) 50 MCG/ACT nasal spray Spray 2 sprays into both nostrils daily 16 g 0     Allergies   Allergen Reactions     Penicillins        Breast  Cancer Screening:    Breast CA Risk Assessment (FHS-7) 1/26/2022   Do you have a family history of breast, colon, or ovarian cancer? No / Unknown           Pertinent mammograms are reviewed under the imaging tab.    History of abnormal Pap smear: NO - age 30-65 PAP every 5 years with negative HPV co-testing recommended  PAP / HPV Latest Ref Rng & Units 4/1/2019   PAP (Historical) - NIL   HPV16 NEG:Negative Negative   HPV18 NEG:Negative Negative   HRHPV NEG:Negative Negative     Reviewed and updated as needed this visit by clinical staff  Tobacco  Allergies  Meds   Med Hx  Surg Hx  Fam Hx         Reviewed and updated as needed this visit by Provider  Tobacco     Med Hx  Surg Hx  Fam Hx            Review of Systems   Constitutional: Negative for chills and fever.   HENT: Negative for congestion, ear pain, hearing loss and sore throat.    Eyes: Negative for pain and visual disturbance.   Respiratory: Negative for cough and shortness of breath.    Cardiovascular: Negative for chest pain, palpitations and peripheral edema.   Gastrointestinal: Negative for abdominal pain, constipation, diarrhea, heartburn, hematochezia and nausea.   Breasts:  Negative for tenderness, breast mass and discharge.   Genitourinary: Negative for dysuria, frequency, genital sores, hematuria, pelvic pain, urgency, vaginal bleeding and vaginal discharge.   Musculoskeletal: Negative for arthralgias, joint swelling and myalgias.   Skin: Negative for rash.   Neurological: Negative for dizziness, weakness, headaches and paresthesias.   Psychiatric/Behavioral: Negative for mood changes. The patient is not nervous/anxious.      CONSTITUTIONAL: NEGATIVE for fever, chills, change in weight  INTEGUMENTARU/SKIN: NEGATIVE for worrisome rashes, moles or lesions  EYES: NEGATIVE for vision changes or irritation  ENT: NEGATIVE for ear, mouth and throat problems  RESP: NEGATIVE for significant cough or SOB  BREAST: NEGATIVE for masses, tenderness or  "discharge  CV: NEGATIVE for chest pain, palpitations or peripheral edema  GI: NEGATIVE for nausea, abdominal pain, heartburn, or change in bowel habits  : NEGATIVE for unusual urinary or vaginal symptoms. Periods are regular.  MUSCULOSKELETAL: NEGATIVE for significant arthralgias or myalgia  NEURO: NEGATIVE for weakness, dizziness or paresthesias  PSYCHIATRIC: NEGATIVE for changes in mood or affect     OBJECTIVE:   /82   Pulse 78   Temp 97.8  F (36.6  C) (Tympanic)   Resp 12   Ht 1.626 m (5' 4\")   Wt 76.8 kg (169 lb 6 oz)   LMP 01/06/2022   SpO2 96%   Breastfeeding No   BMI 29.07 kg/m    Physical Exam  GENERAL: healthy, alert and no distress  EYES: Eyes grossly normal to inspection, PERRL and conjunctivae and sclerae normal  HENT: ear canals and TM's normal, nose and mouth without ulcers or lesions  NECK: no adenopathy, no asymmetry, masses, or scars and thyroid normal to palpation  RESP: lungs clear to auscultation - no rales, rhonchi or wheezes  BREAST: normal without masses, tenderness or nipple discharge and no palpable axillary masses or adenopathy  CV: regular rate and rhythm, normal S1 S2, no S3 or S4, no murmur, click or rub, no peripheral edema and peripheral pulses strong  ABDOMEN: soft, nontender, no hepatosplenomegaly, no masses and bowel sounds normal  MS: no gross musculoskeletal defects noted, no edema  SKIN: no suspicious lesions or rashes  NEURO: Normal strength and tone, mentation intact and speech normal  PSYCH: mentation appears normal, affect normal/bright    Diagnostic Test Results:  Future labs ordered.     ASSESSMENT/PLAN:   Aspen was seen today for physical.    Diagnoses and all orders for this visit:    Routine general medical examination at a health care facility  -     REVIEW OF HEALTH MAINTENANCE PROTOCOL ORDERS    Screening for HIV (human immunodeficiency virus)  -     HIV Antigen Antibody Combo; Future    Need for hepatitis C screening test  -     Hepatitis C " "Screen Reflex to HCV RNA Quant and Genotype; Future    Lipid screening  -     Lipid panel reflex to direct LDL Fasting; Future    Screening for diabetes mellitus  -     Glucose; Future    Left shoulder pain, unspecified chronicity- improving.         - Continue with conservative management. RICE therapy and NSAIDs prn.         Patient has been advised of split billing requirements and indicates understanding: No    COUNSELING:  Reviewed preventive health counseling, as reflected in patient instructions       Regular exercise       Healthy diet/nutrition    Estimated body mass index is 29.07 kg/m  as calculated from the following:    Height as of this encounter: 1.626 m (5' 4\").    Weight as of this encounter: 76.8 kg (169 lb 6 oz).    Weight management plan: Discussed healthy diet and exercise guidelines    She reports that she has never smoked. She has never used smokeless tobacco.      Counseling Resources:  ATP IV Guidelines  Pooled Cohorts Equation Calculator  Breast Cancer Risk Calculator  BRCA-Related Cancer Risk Assessment: FHS-7 Tool  FRAX Risk Assessment  ICSI Preventive Guidelines  Dietary Guidelines for Americans, 2010  USDA's MyPlate  ASA Prophylaxis  Lung CA Screening    Follow up annually and as needed thoughout the year.    Katerin Smith MD  [facility]  "

## 2022-09-10 ENCOUNTER — HEALTH MAINTENANCE LETTER (OUTPATIENT)
Age: 37
End: 2022-09-10

## 2022-09-26 ENCOUNTER — OFFICE VISIT (OUTPATIENT)
Dept: FAMILY MEDICINE | Facility: CLINIC | Age: 37
End: 2022-09-26
Payer: COMMERCIAL

## 2022-09-26 VITALS
DIASTOLIC BLOOD PRESSURE: 77 MMHG | RESPIRATION RATE: 18 BRPM | HEIGHT: 64 IN | TEMPERATURE: 98 F | BODY MASS INDEX: 28.75 KG/M2 | SYSTOLIC BLOOD PRESSURE: 131 MMHG | WEIGHT: 168.4 LBS | OXYGEN SATURATION: 99 % | HEART RATE: 59 BPM

## 2022-09-26 DIAGNOSIS — S99.911A ANKLE INJURY, RIGHT, INITIAL ENCOUNTER: Primary | ICD-10-CM

## 2022-09-26 PROBLEM — N20.0 NEPHROLITHIASIS: Status: ACTIVE | Noted: 2020-01-21

## 2022-09-26 PROCEDURE — 99214 OFFICE O/P EST MOD 30 MIN: CPT | Performed by: FAMILY MEDICINE

## 2022-09-26 NOTE — PROGRESS NOTES
"  Assessment & Plan     Ankle injury, right, initial encounter  No bony injury on xray - ankle brace and RICE.  Follow up if not improving in the next week. Get new running shoes  - XR Ankle Right G/E 3 Views; Future  - Ankle/Foot Bracing Supplies Order for DME - ONLY FOR DME       BMI:   Estimated body mass index is 28.91 kg/m  as calculated from the following:    Height as of this encounter: 1.626 m (5' 4\").    Weight as of this encounter: 76.4 kg (168 lb 6.4 oz).   Weight management plan: Discussed healthy diet and exercise guidelines      Return in about 1 week (around 10/3/2022), or if symptoms worsen or fail to improve.    Mandy Howard MD  [facility] is a 37 year old presenting for the following health issues:  Foot Pain (R)    History of Present Illness       Reason for visit:  Lateral foot pain. Right foot  Symptom onset:  3-7 days ago    She eats 4 or more servings of fruits and vegetables daily.She consumes 1 sweetened beverage(s) daily.She exercises with enough effort to increase her heart rate 60 or more minutes per day.  She exercises with enough effort to increase her heart rate 6 days per week.   She is taking medications regularly.     Pain History:  When did you first notice your pain? - Acute Pain   Have you seen anyone else for your pain? No  Where in your body do you have pain? Musculoskeletal problem/pain  Onset/Duration: 7 days  Description  Location: foot - right, lateral radiating up into R ankle  Joint Swelling: YES- at ankle  Redness: No  Pain: YES  Warmth: No  Intensity:  3/10 when not on it, 6/10 while walking on it, standing for a long time gets sharp, stabbing pains at 9/10  Progression of Symptoms:  worsening and constant, can increase in pain based on walking and standing on it  Accompanying signs and symptoms:   Fevers: No  Numbness/tingling/weakness: YES- some tingling  History  Trauma to the area: YES- 8.5 mile run, " "has been training  Recent illness:  No  Previous similar problem: No  Previous evaluation:  No  Precipitating or alleviating factors:  Aggravating factors include: standing, walking and climbing stairs  Therapies tried and outcome: heat, ice, massage, stretching, support wrap and ibuprofen 600 mg without relief          Review of Systems   Constitutional, HEENT, cardiovascular, pulmonary, gi and gu systems are negative, except as otherwise noted.      Objective    /77 (BP Location: Left arm, Patient Position: Sitting, Cuff Size: Adult Large)   Pulse 59   Temp 98  F (36.7  C) (Oral)   Resp 18   Ht 1.626 m (5' 4\")   Wt 76.4 kg (168 lb 6.4 oz)   SpO2 99%   BMI 28.91 kg/m    Body mass index is 28.91 kg/m .  Physical Exam   GENERAL: healthy, alert and no distress  MS: tenderness and swelling just below right lateral malleolus  NEURO: Normal strength and tone, mentation intact and speech normal    Xray - Reviewed and interpreted by me.  No fractures                "

## 2022-09-26 NOTE — PATIENT INSTRUCTIONS
At [facility], we strive to deliver an exceptional experience to you, every time we see you. If you receive a survey, please complete it as we do value your feedback.  If you have MyChart, you can expect to receive results automatically within 24 hours of their completion.  Your provider will send a note interpreting your results as well.   If you do not have MyChart, you should receive your results in about a week by mail.    Your care team:                            Family Medicine Internal Medicine   MD Daryl Recio MD Shantel Branch-Fleming, MD Srinivasa Vaka, MD Katya Belousova, MAXIMILIANO Bautista CNP, MD ([facility]   Jose Raul Saucedo, MD Dunia Perez MD Amelia Massimini APRN CNP Kim Thein, APRN CNP Bethany Templen, MD             Clinic hours: Monday - Thursday 7 am-6 pm; Fridays 7 am-5 pm.   Urgent care: Monday - Friday 10 am- 8 pm; Saturday and Sunday 9 am-5 pm.    Clinic: [phone]       [facility]: Monday - Thursday 8 am - 7 pm; Friday 8 am - 6 pm  [facility]: [phone]

## 2022-10-03 ENCOUNTER — MYC MEDICAL ADVICE (OUTPATIENT)
Dept: FAMILY MEDICINE | Facility: CLINIC | Age: 37
End: 2022-10-03

## 2022-10-11 ENCOUNTER — OFFICE VISIT (OUTPATIENT)
Dept: FAMILY MEDICINE | Facility: CLINIC | Age: 37
End: 2022-10-11
Payer: COMMERCIAL

## 2022-10-11 VITALS
RESPIRATION RATE: 16 BRPM | DIASTOLIC BLOOD PRESSURE: 81 MMHG | HEART RATE: 63 BPM | WEIGHT: 166 LBS | TEMPERATURE: 97.1 F | BODY MASS INDEX: 28.49 KG/M2 | SYSTOLIC BLOOD PRESSURE: 126 MMHG | OXYGEN SATURATION: 97 %

## 2022-10-11 DIAGNOSIS — L70.0 ACNE VULGARIS: ICD-10-CM

## 2022-10-11 DIAGNOSIS — Z30.011 ENCOUNTER FOR INITIAL PRESCRIPTION OF CONTRACEPTIVE PILLS: Primary | ICD-10-CM

## 2022-10-11 DIAGNOSIS — Z23 HIGH PRIORITY FOR 2019-NCOV VACCINE: ICD-10-CM

## 2022-10-11 DIAGNOSIS — Z23 NEED FOR PROPHYLACTIC VACCINATION AND INOCULATION AGAINST INFLUENZA: ICD-10-CM

## 2022-10-11 PROCEDURE — 99213 OFFICE O/P EST LOW 20 MIN: CPT | Mod: 25 | Performed by: FAMILY MEDICINE

## 2022-10-11 PROCEDURE — 90471 IMMUNIZATION ADMIN: CPT | Performed by: FAMILY MEDICINE

## 2022-10-11 PROCEDURE — 0134A COVID-19,PF,MODERNA BIVALENT: CPT | Performed by: FAMILY MEDICINE

## 2022-10-11 PROCEDURE — 90686 IIV4 VACC NO PRSV 0.5 ML IM: CPT | Performed by: FAMILY MEDICINE

## 2022-10-11 PROCEDURE — 91313 COVID-19,PF,MODERNA BIVALENT: CPT | Performed by: FAMILY MEDICINE

## 2022-10-11 RX ORDER — NORETHINDRONE ACETATE 5 MG
5 TABLET ORAL DAILY
Qty: 90 TABLET | Refills: 0 | Status: SHIPPED | OUTPATIENT
Start: 2022-10-11 | End: 2023-01-10

## 2022-10-11 ASSESSMENT — PAIN SCALES - GENERAL: PAINLEVEL: NO PAIN (0)

## 2022-10-11 NOTE — PROGRESS NOTES
Assessment & Plan     Encounter for initial prescription of contraceptive pills  Wishes to delay menses due to upcoming vacation in November.   - norethindrone (AYGESTIN) 5 MG tablet  Dispense: 90 tablet; Refill: 0    Acne vulgaris   - Adult Dermatology Referral    High priority for 2019-nCoV vaccine  - COVID-19,PF,MODERNA BIVALENT (18+YRS)    Need for prophylactic vaccination and inoculation against influenza  - INFLUENZA VACCINE IM > 6 MONTHS VALENT IIV4 (AFLURIA/FLUZONE)        Return in about 3 months (around 1/11/2023) for Physical Exam.    Katerin Smith MD  [facility] JAIME Andino is a 37 year old, presenting for the following health issues:  Derm Problem      History of Present Illness       Reason for visit:  Wants to discuss referal to derm to start retaking spironolactone. Wanting to delay period in November.  Symptom onset:  Today  Had these symptoms before:  No    She eats 4 or more servings of fruits and vegetables daily.She consumes 2 sweetened beverage(s) daily.She exercises with enough effort to increase her heart rate 60 or more minutes per day.  She exercises with enough effort to increase her heart rate 6 days per week.   She is taking medications regularly.     Delay menses-  Patient is planning to go to [place] for vacation at the beginning of November.   She would also like to discuss delaying her period until after November.   She is not currently taking any contraception. LMS 10/3/22.         Spironolactone    Taking Medication as prescribed: NO-not currently prescribed. Last took it more than a year ago.  Would like to restart this medication for acne     Side Effects:  None    Medication Helps Symptoms:  Yes    Has seen Dermatology in the past that has prescribed it for her.   Not open to other initial acne treatment modalities at this time.       HEALTH CARE MAINTENANCE: Due for a Flu shot and COVID vaccine.  Due for a Pap in 4/2024.      Review of  Systems   Constitutional, HEENT, cardiovascular, pulmonary, gi and gu systems are negative, except as otherwise noted.      Objective    /81   Pulse 63   Temp 97.1  F (36.2  C) (Tympanic)   Resp 16   Wt 75.3 kg (166 lb)   LMP 10/03/2022 (Exact Date)   SpO2 97%   BMI 28.49 kg/m    Body mass index is 28.49 kg/m .  Physical Exam   GENERAL: healthy, alert and no distress  SKIN: no suspicious lesions or rashes  PSYCH: mentation appears normal, affect normal/bright    DATA  Previous labs reviewed.

## 2022-12-01 ENCOUNTER — OFFICE VISIT (OUTPATIENT)
Dept: DERMATOLOGY | Facility: CLINIC | Age: 37
End: 2022-12-01
Payer: COMMERCIAL

## 2022-12-01 DIAGNOSIS — L70.0 ACNE VULGARIS: ICD-10-CM

## 2022-12-01 PROCEDURE — 99203 OFFICE O/P NEW LOW 30 MIN: CPT | Performed by: PHYSICIAN ASSISTANT

## 2022-12-01 RX ORDER — SPIRONOLACTONE 100 MG/1
100 TABLET, FILM COATED ORAL DAILY
Qty: 90 TABLET | Refills: 3 | Status: SHIPPED | OUTPATIENT
Start: 2022-12-01

## 2022-12-01 RX ORDER — TRETINOIN 0.25 MG/G
CREAM TOPICAL
Qty: 45 G | Refills: 5 | Status: SHIPPED | OUTPATIENT
Start: 2022-12-01

## 2022-12-01 ASSESSMENT — PAIN SCALES - GENERAL: PAINLEVEL: NO PAIN (0)

## 2022-12-01 NOTE — LETTER
12/1/2022         RE: Aspen Gonzalez  [address] [address]        Dear Colleague,    Thank you for referring your patient, Aspen Gonzalez, to the [facility]. Please see a copy of my visit note below.    Aspen Gonzalez is an extremely pleasant 37 year old year old female patient here today for acne vulgaris. She notes has been present for year. She has tried antibiotic, tretinoin, and spironolactone in the past. She notes spironolactone seemed to help the most. She denied any side effects. She reports she had a hysterectomy. She would like to restart spironolactone. Patient has no other skin complaints today.  Remainder of the HPI, Meds, PMH, Allergies, FH, and SH was reviewed in chart.    Pertinent Hx:  Acne vulgaris   Past Medical History:   Diagnosis Date     History of blood transfusion      History of carbon monoxide poisoning     age 7, hospitalized x 2 days     Kidney stones     non-obstructing      Septate uterus - possible noted on OB intake US 10/16/2017       Past Surgical History:   Procedure Laterality Date     DILATION AND CURETTAGE SUCTION N/A 10/20/2017    Procedure: DILATION AND CURETTAGE SUCTION;  Suction Dilation and Curettage ;  Surgeon: Kathrin Donnelly MD;  [facility]     HC TOOTH EXTRACTION W/FORCEP       OPERATIVE HYSTEROSCOPY N/A 12/15/2017    Procedure: OPERATIVE HYSTEROSCOPY;  Operative Hysteroscopy, Resection of Polyp;  Surgeon: Shalini Nichole MD;  [facility]        Family History   Problem Relation Age of Onset     Breast Cancer No family hx of      Cancer - colorectal No family hx of      Prostate Cancer No family hx of      C.A.D. No family hx of      Diabetes No family hx of      Bleeding Disorder No family hx of      Anesthesia Reaction No family hx of        Social History     Socioeconomic History     Marital status:      Spouse name: Not on file      Number of children: Not on file     Years of education: Not on file     Highest education level: Not on file   Occupational History     Not on file   Tobacco Use     Smoking status: Never     Smokeless tobacco: Never   Vaping Use     Vaping Use: Never used   Substance and Sexual Activity     Alcohol use: No     Drug use: No     Sexual activity: Yes     Partners: Male   Other Topics Concern     Parent/sibling w/ CABG, MI or angioplasty before 65F 55M? Not Asked   Social History Narrative     Not on file     Social Determinants of Health     Financial Resource Strain: Not on file   Food Insecurity: Not on file   Transportation Needs: Not on file   Physical Activity: Not on file   Stress: Not on file   Social Connections: Not on file   Intimate Partner Violence: Not on file   Housing Stability: Not on file       Outpatient Encounter Medications as of 12/1/2022   Medication Sig Dispense Refill     spironolactone (ALDACTONE) 100 MG tablet Take 1 tablet (100 mg) by mouth daily 90 tablet 3     tretinoin (RETIN-A) 0.025 % external cream Apply pea sized amount at bedtime. 45 g 5     fluticasone (FLONASE) 50 MCG/ACT nasal spray Spray 2 sprays into both nostrils daily (Patient not taking: Reported on 10/11/2022) 16 g 0     norethindrone (AYGESTIN) 5 MG tablet Take 1 tablet (5 mg) by mouth daily (Patient not taking: Reported on 12/1/2022) 90 tablet 0     No facility-administered encounter medications on file as of 12/1/2022.             O:   NAD, WDWN, Alert & Oriented, Mood & Affect wnl, Vitals stable   Here today alone   LMP 10/03/2022 (Exact Date)    General appearance normal   Vitals stable   Alert, oriented and in no acute distress     Few inflammatory papules, pih on lower half of face    Eyes: Conjunctivae/lids:Normal     ENT: Lips: normal    MSK:Normal    Pulm: Breathing Normal    Neuro/Psych: Orientation:Alert and Orientedx3 ; Mood/Affect:normal   A/P:  1. Acne vulgaris   It was a pleasure speaking to Aspen COPELAND  Sarah Gonzalez today.  Take spironolactone 100 mg daily.   Discussed may decreased bp. Discussed may increase potassium level.   Apply tretinoin at bedtime.   Use daily moisturizers.   Recheck in one year or sooner if needed.       Again, thank you for allowing me to participate in the care of your patient.        Sincerely,        Rosana Laguerre PA-C

## 2022-12-02 NOTE — PROGRESS NOTES
Ryannlaly Gonzalez is an extremely pleasant 37 year old year old female patient here today for acne vulgaris. She notes has been present for year. She has tried antibiotic, tretinoin, and spironolactone in the past. She notes spironolactone seemed to help the most. She denied any side effects. She reports she had a hysterectomy. She would like to restart spironolactone. Patient has no other skin complaints today.  Remainder of the HPI, Meds, PMH, Allergies, FH, and SH was reviewed in chart.    Pertinent Hx:  Acne vulgaris   Past Medical History:   Diagnosis Date     History of blood transfusion      History of carbon monoxide poisoning     age 7, hospitalized x 2 days     Kidney stones     non-obstructing      Septate uterus - possible noted on OB intake US 10/16/2017       Past Surgical History:   Procedure Laterality Date     DILATION AND CURETTAGE SUCTION N/A 10/20/2017    Procedure: DILATION AND CURETTAGE SUCTION;  Suction Dilation and Curettage ;  Surgeon: Kathrin Donnelly MD;  [facility]     HC TOOTH EXTRACTION W/FORCEP       OPERATIVE HYSTEROSCOPY N/A 12/15/2017    Procedure: OPERATIVE HYSTEROSCOPY;  Operative Hysteroscopy, Resection of Polyp;  Surgeon: Shalini Nichole MD;  [facility]        Family History   Problem Relation Age of Onset     Breast Cancer No family hx of      Cancer - colorectal No family hx of      Prostate Cancer No family hx of      C.A.D. No family hx of      Diabetes No family hx of      Bleeding Disorder No family hx of      Anesthesia Reaction No family hx of        Social History     Socioeconomic History     Marital status:      Spouse name: Not on file     Number of children: Not on file     Years of education: Not on file     Highest education level: Not on file   Occupational History     Not on file   Tobacco Use     Smoking status: Never     Smokeless tobacco: Never   Vaping Use     Vaping Use: Never used   Substance and Sexual Activity      Alcohol use: No     Drug use: No     Sexual activity: Yes     Partners: Male   Other Topics Concern     Parent/sibling w/ CABG, MI or angioplasty before 65F 55M? Not Asked   Social History Narrative     Not on file     Social Determinants of Health     Financial Resource Strain: Not on file   Food Insecurity: Not on file   Transportation Needs: Not on file   Physical Activity: Not on file   Stress: Not on file   Social Connections: Not on file   Intimate Partner Violence: Not on file   Housing Stability: Not on file       Outpatient Encounter Medications as of 12/1/2022   Medication Sig Dispense Refill     spironolactone (ALDACTONE) 100 MG tablet Take 1 tablet (100 mg) by mouth daily 90 tablet 3     tretinoin (RETIN-A) 0.025 % external cream Apply pea sized amount at bedtime. 45 g 5     fluticasone (FLONASE) 50 MCG/ACT nasal spray Spray 2 sprays into both nostrils daily (Patient not taking: Reported on 10/11/2022) 16 g 0     norethindrone (AYGESTIN) 5 MG tablet Take 1 tablet (5 mg) by mouth daily (Patient not taking: Reported on 12/1/2022) 90 tablet 0     No facility-administered encounter medications on file as of 12/1/2022.             O:   NAD, WDWN, Alert & Oriented, Mood & Affect wnl, Vitals stable   Here today alone   LMP 10/03/2022 (Exact Date)    General appearance normal   Vitals stable   Alert, oriented and in no acute distress     Few inflammatory papules, pih on lower half of face    Eyes: Conjunctivae/lids:Normal     ENT: Lips: normal    MSK:Normal    Pulm: Breathing Normal    Neuro/Psych: Orientation:Alert and Orientedx3 ; Mood/Affect:normal   A/P:  1. Acne vulgaris   It was a pleasure speaking to Aspen Gonzalez today.  Take spironolactone 100 mg daily.   Discussed may decreased bp. Discussed may increase potassium level.   Apply tretinoin at bedtime.   Use daily moisturizers.   Recheck in one year or sooner if needed.

## 2023-01-09 DIAGNOSIS — Z30.011 ENCOUNTER FOR INITIAL PRESCRIPTION OF CONTRACEPTIVE PILLS: ICD-10-CM

## 2023-01-10 RX ORDER — NORETHINDRONE ACETATE 5 MG
TABLET ORAL
Qty: 90 TABLET | Refills: 0 | Status: SHIPPED | OUTPATIENT
Start: 2023-01-10 | End: 2023-02-01

## 2023-01-25 ENCOUNTER — TELEPHONE (OUTPATIENT)
Dept: DERMATOLOGY | Facility: CLINIC | Age: 38
End: 2023-01-25
Payer: COMMERCIAL

## 2023-01-25 NOTE — TELEPHONE ENCOUNTER
Received fax from:  [facility]   Ph:  [phone]  Fax:  [phone]    tretinoin microsphere (RETIN-A MICRO) 0.04 % external gel      Pharmacy Comments:  PRODUCT BACKORDERED/UNAVAILABLE: PRESCRIBED PRODUCT NOT IN STOCK. PLEASE CONSIDER THE COST-EFFECTIVE POTENTIAL ALTERNATIVE(S) LISTED AND EVALUATE IF APPROPRIATE FOR YOUR PATIENT'S INDICATION AND TREATMENT GOALS.    SUGGESTED ALTERNATIVES:  TRETINOIN 0.025% CREAM    Brant Anne  [facility]

## 2023-01-25 NOTE — CONFIDENTIAL NOTE
Left message on answering machine for patient to call back.  Thank you,    Mary QUEVEDORN BSN  [facility]- [phone]

## 2023-01-25 NOTE — TELEPHONE ENCOUNTER
Please let patient know that retin a microsphere 0.04% gel  is backordered at her pharmacy, unsure if other pharmacies have it in stock.   Patient can call other pharmacies if wanted or we can send in differin instead.

## 2023-01-27 NOTE — CONFIDENTIAL NOTE
Left message on answering machine for patient to call back or respond to [employer] message.    Thank you,    Mary QUEVEDORN N  [facility]- [phone]

## 2023-01-31 NOTE — TELEPHONE ENCOUNTER
Unable to reach patient.     Message left that we got a message from pharmacy that gel medication ordered for pt is on back order. Pt can check with other pharmacies to see if they have it in stock and we could send order to another pharmacy if she wants gel. Or Provider can order a different medication or cream.     Looks like cream was ordered first, last month.    Can call back or send us a My chart message if that is easier for pt to reach us.    Barbara Guillen RN

## 2023-02-01 ENCOUNTER — E-VISIT (OUTPATIENT)
Dept: FAMILY MEDICINE | Facility: CLINIC | Age: 38
End: 2023-02-01
Payer: COMMERCIAL

## 2023-02-01 ENCOUNTER — MYC MEDICAL ADVICE (OUTPATIENT)
Dept: FAMILY MEDICINE | Facility: CLINIC | Age: 38
End: 2023-02-01

## 2023-02-01 DIAGNOSIS — M54.50 RECURRENT LOW BACK PAIN: Primary | ICD-10-CM

## 2023-02-01 PROCEDURE — 99421 OL DIG E/M SVC 5-10 MIN: CPT | Performed by: FAMILY MEDICINE

## 2023-02-01 RX ORDER — CYCLOBENZAPRINE HCL 10 MG
10 TABLET ORAL 3 TIMES DAILY PRN
Qty: 30 TABLET | Refills: 0 | Status: SHIPPED | OUTPATIENT
Start: 2023-02-01

## 2023-02-01 NOTE — PATIENT INSTRUCTIONS
Caring for Your Back    You are not alone.    Low back pain is very common. Nearly half of all adults will have low back pain in any given year. The good news is that back pain is rarely a danger to your health. Most people can manage their back pain on their own. About half of people start feeling better within two weeks. In 9 out of 10 cases, low back pain goes away or no longer limits daily activity within 6 weeks.     Your outlook is good!     Your symptoms tell us that your low back pain is most likely not a danger to you. Most of the time we will not know the exact cause of low back pain, even if you see a doctor or have an MRI. However, treatment can still work without knowing the cause of the pain. Less than 1 in 100 people need surgery for their back pain.     What can I do about my low back pain?     There are three basic things you can do to ease low back pain and help it go away.     Use heat or cold packs.    Take medicine as directed.    Use positions, movements and exercises.    Using heat or cold packs:    Try cold packs or gentle heat to ease your pain.  Use whichever gives you the most relief. Apply the cold pack or heat for 15 minutes at a time, as often as needed.    Taking medicine:    If taking over-the-counter medicine:    Take ibuprofen (Advil, Motrin) 600 mg three times a day as needed for pain.  OR    Take Aleve (naproxen) 220 to 440 mg two times a day as needed for pain. If your doctor prescribed a muscle relaxant (cyclobenzaprine 10 mg.):    Take   to 1 tablet at bedtime.    Do not drive when taking this medicine. This drug may make you sleepy.     Using positions, movements and exercises:    Research tells us that moving your joints and muscles can help you recover from back pain. Such activity should be simple and gentle. Use the positions below as well as walking to help relieve your pain. Try taking a short walk every 3 to 4 hours during the day. Walk for a few minutes inside your  home or take longer walks outside, on a treadmill or at a mall. Slowly increase the amount of time you walk. Expect discomfort when you begin, but it should lessen as your back starts to heal. When your back feels better, walk daily to keep your back and body healthy.    Finding a position that is comfortable:    When your back pain is new, certain positions will ease your pain. Gently try each of the positions below until you find one that is helpful. Once you find a position of comfort, use it as often as you like when you are resting. You will recover faster if you combine rest with activity.    * Lie on your back with your legs bent. You can do this by placing a pillow under your knees or lie on the floor and rest your lower legs on the seat of a chair.  * Lie on your side with your knees bent and place a pillow between your knees.    Lie on your stomach over pillows.       When should I call my doctor?    Your back pain should improve over the first couple of weeks. As it improves, you should be able to return to your normal activities.  But call your doctor if:      You have a sudden change in your ability to control your bladder or bowels.    You begin to feel tingling in your groin or legs.    The pain spreads down your leg and into your foot.    Your toes, feet or leg muscles begin to feel weak.    You feel generally unwell or sick.    Your pain gets worse.    If you are deaf or hard of hearing, please let us know. We provide many free services including sign language interpreters, oral interpreters, TTYs, telephone amplifiers, note takers and written materials.    For informational purposes only. Not to replace the advice of your health care provider. Copyright   2013 [facility]. All rights reserved. [employer] 809619 - 04/13.

## 2023-02-01 NOTE — TELEPHONE ENCOUNTER
I sent patient a My chart reply to let her know via open 1-24-23 My chart message. Barbara Guillen RN

## 2023-02-01 NOTE — TELEPHONE ENCOUNTER
Routing MyChart to PCP regarding f/u on e-visit for back pain.  Patient is asking for PT now.    Willis Montilla RN

## 2023-02-07 ENCOUNTER — THERAPY VISIT (OUTPATIENT)
Dept: PHYSICAL THERAPY | Facility: CLINIC | Age: 38
End: 2023-02-07
Attending: FAMILY MEDICINE
Payer: COMMERCIAL

## 2023-02-07 DIAGNOSIS — M54.50 RECURRENT LOW BACK PAIN: ICD-10-CM

## 2023-02-07 DIAGNOSIS — M54.50 RIGHT-SIDED LOW BACK PAIN WITHOUT SCIATICA: ICD-10-CM

## 2023-02-07 PROCEDURE — 97161 PT EVAL LOW COMPLEX 20 MIN: CPT | Mod: GP | Performed by: PHYSICAL THERAPIST

## 2023-02-07 PROCEDURE — 97110 THERAPEUTIC EXERCISES: CPT | Mod: GP | Performed by: PHYSICAL THERAPIST

## 2023-02-07 NOTE — PROGRESS NOTES
Physical Therapy Initial Evaluation  Subjective:  The history is provided by the patient. No  was used.   Patient Health History  Aspen Gonzalez being seen for Chronic low back pain that has been flaring up.     Problem began: 2/1/2022 (referral date).   Problem occurred: I bent over to  a towel 11 years ago and by low back has never been the same since. Lately it has really been flaring up, likely due to increase in weightlifting and tight muscles pretty much everywhere   Pain is reported as 6/10 on pain scale.  General health as reported by patient is excellent.  Pertinent medical history includes: asthma, calf pain-swelling-warmth and pain at night/rest.   Red flags:  None as reported by patient.  Medical allergies: none.   Surgeries include:  Other. Other surgery history details: [place] teeth removal.    Current medications:  Muscle relaxants and other. Other medications details: Spironolactone for acne.    Current occupation is  of [employer].   Primary job tasks include:  Computer work.   Other job/home tasks details: works 50/50 home and work- [employer] .                Therapist Generated HPI Evaluation         Type of problem:  Lumbar.    This is a recurrent (general every 3-4 months) condition.  Condition occurred with:  Lifting.    Patient reports pain:  Lumbar spine right (radiates across the spine).  Pain is described as aching (tingling into R glute) and is intermittent.  Pain radiates to:  Gluteals right.   Since onset symptoms are gradually worsening.  Exacerbated by: lifting and leg day exercises, sometimes a certain chair.  Relieved by: not performing agravating activities.    Previous treatment includes chiropractic (pt reports chiropractic helps when it is flared up).   Restrictions due to condition include:  Working in normal job without restrictions.  Barriers include:  None as reported by patient.    Goal: get  back to lifting with no pain.                      Objective:  System         Lumbar/SI Evaluation  ROM:    AROM Lumbar:   Flexion:          Nil loss flexion  Ext:                    Severe loss extension   Side Bend:        Left:     Right:   Rotation:           Left:     Right:   Side Glide:        Left:  Nil loss, painful    Right:  Nil loss, painfree                  Neural Tension/Mobility:      Left side:Slump  negative.     Right side:   Slump  negative.         Spinal Segmental Conclusions:     Level: Hypo noted at L3, L4, L5 and S1                                                   General     ROS    Assessment/Plan:    Patient is a 37 year old female with lumbar complaints.    Patient has the following significant findings with corresponding treatment plan.                Diagnosis 1:  Recurrent Low Back Pain  Pain -  manual therapy, self management, education, directional preference exercise and home program  Decreased ROM/flexibility - manual therapy and therapeutic exercise  Decreased joint mobility - manual therapy and therapeutic exercise  Decreased function - therapeutic activities    Therapy Evaluation Codes:   1) History comprised of:   Personal factors that impact the plan of care:      Time since onset of symptoms.    Comorbidity factors that impact the plan of care are:      None.     Medications impacting care: None.  2) Examination of Body Systems comprised of:   Body structures and functions that impact the plan of care:      Lumbar spine.   Activity limitations that impact the plan of care are:      sitting, exercising, lifting.  3) Clinical presentation characteristics are:   Stable/Uncomplicated.  4) Decision-Making    Low complexity using standardized patient assessment instrument and/or measureable assessment of functional outcome.  Cumulative Therapy Evaluation is: Low complexity.    Previous and current functional limitations:  (See Goal Flow Sheet for this information)    Short term  and Long term goals: (See Goal Flow Sheet for this information)     Communication ability:  Patient appears to be able to clearly communicate and understand verbal and written communication and follow directions correctly.  Treatment Explanation - The following has been discussed with the patient:   RX ordered/plan of care  Anticipated outcomes  Possible risks and side effects  This patient would benefit from PT intervention to resume normal activities.   Rehab potential is excellent.    Frequency:  1 X week, once daily  Duration:  for 6 weeks  Discharge Plan:  Achieve all LTG.  Independent in home treatment program.  Reach maximal therapeutic benefit.    Please refer to the daily flowsheet for treatment today, total treatment time and time spent performing 1:1 timed codes.

## 2023-02-15 ENCOUNTER — THERAPY VISIT (OUTPATIENT)
Dept: PHYSICAL THERAPY | Facility: CLINIC | Age: 38
End: 2023-02-15
Payer: COMMERCIAL

## 2023-02-15 DIAGNOSIS — M54.50 RIGHT-SIDED LOW BACK PAIN WITHOUT SCIATICA: Primary | ICD-10-CM

## 2023-02-15 PROCEDURE — 97110 THERAPEUTIC EXERCISES: CPT | Mod: GP | Performed by: PHYSICAL THERAPIST

## 2023-02-15 PROCEDURE — 97140 MANUAL THERAPY 1/> REGIONS: CPT | Mod: GP | Performed by: PHYSICAL THERAPIST

## 2023-02-24 ENCOUNTER — THERAPY VISIT (OUTPATIENT)
Dept: PHYSICAL THERAPY | Facility: CLINIC | Age: 38
End: 2023-02-24
Payer: COMMERCIAL

## 2023-02-24 DIAGNOSIS — M54.50 RIGHT-SIDED LOW BACK PAIN WITHOUT SCIATICA: Primary | ICD-10-CM

## 2023-02-24 PROCEDURE — 97140 MANUAL THERAPY 1/> REGIONS: CPT | Mod: GP | Performed by: PHYSICAL THERAPIST

## 2023-02-24 PROCEDURE — 97110 THERAPEUTIC EXERCISES: CPT | Mod: GP | Performed by: PHYSICAL THERAPIST

## 2023-03-03 ENCOUNTER — THERAPY VISIT (OUTPATIENT)
Dept: PHYSICAL THERAPY | Facility: CLINIC | Age: 38
End: 2023-03-03
Payer: COMMERCIAL

## 2023-03-03 DIAGNOSIS — M54.50 RIGHT-SIDED LOW BACK PAIN WITHOUT SCIATICA: Primary | ICD-10-CM

## 2023-03-03 PROCEDURE — 97110 THERAPEUTIC EXERCISES: CPT | Mod: GP | Performed by: PHYSICAL THERAPIST

## 2023-03-03 PROCEDURE — 97530 THERAPEUTIC ACTIVITIES: CPT | Mod: GP | Performed by: PHYSICAL THERAPIST

## 2023-03-03 PROCEDURE — 97140 MANUAL THERAPY 1/> REGIONS: CPT | Mod: GP | Performed by: PHYSICAL THERAPIST

## 2023-03-17 ENCOUNTER — THERAPY VISIT (OUTPATIENT)
Dept: PHYSICAL THERAPY | Facility: CLINIC | Age: 38
End: 2023-03-17
Payer: COMMERCIAL

## 2023-03-17 DIAGNOSIS — M54.50 RIGHT-SIDED LOW BACK PAIN WITHOUT SCIATICA: Primary | ICD-10-CM

## 2023-03-17 PROCEDURE — 97110 THERAPEUTIC EXERCISES: CPT | Mod: GP | Performed by: PHYSICAL THERAPIST

## 2023-04-21 ENCOUNTER — THERAPY VISIT (OUTPATIENT)
Dept: PHYSICAL THERAPY | Facility: CLINIC | Age: 38
End: 2023-04-21
Payer: COMMERCIAL

## 2023-04-21 DIAGNOSIS — M54.50 RIGHT-SIDED LOW BACK PAIN WITHOUT SCIATICA: Primary | ICD-10-CM

## 2023-04-21 PROCEDURE — 97140 MANUAL THERAPY 1/> REGIONS: CPT | Mod: GP | Performed by: PHYSICAL THERAPIST

## 2023-04-21 NOTE — PROGRESS NOTES
Assessment/Plan:    DISCHARGE REPORT    Progress reporting period is from 2/7/2023 to 4/21/2023.       SUBJECTIVE  Subjective changes noted by patient:   Subjective: Pt reports just started at [facility] in [place]. pt reports no pain overall, only a few days in which she felt more stiff and sore from moving.   Changes in function:  Yes (See Goal flowsheet attached for changes in current functional level)  Adverse reaction to treatment or activity: None    OBJECTIVE  Changes noted in objective findings:  Yes, min loss lumbar extension, nil loss flexion, nil loss side glide B.  Objective: Core Strength: 5/5 lower and upper abdominals with crunch test and B LE lowering test.     ASSESSMENT/PLAN  Updated problem list and treatment plan:   STG/LTGs have been met or progress has been made towards goals:  Yes (See Goal flow sheet completed today.)  Assessment of Progress: The patient has met all of their long term goals.  Self Management Plans:  Patient has been instructed in a home treatment program.  I have re-evaluated this patient and find that the nature, scope, duration and intensity of the therapy is appropriate for the medical condition of the patient.  Aspen continues to require the following intervention to meet STG and LTG's:  PT    Recommendations:  This patient is ready to be discharged from therapy and continue their home treatment program.    Please refer to the daily flowsheet for treatment today, total treatment time and time spent performing 1:1 timed codes.

## 2023-04-30 ENCOUNTER — HEALTH MAINTENANCE LETTER (OUTPATIENT)
Age: 38
End: 2023-04-30

## 2024-07-07 ENCOUNTER — HEALTH MAINTENANCE LETTER (OUTPATIENT)
Age: 39
End: 2024-07-07

## 2025-07-13 ENCOUNTER — HEALTH MAINTENANCE LETTER (OUTPATIENT)
Age: 40
End: 2025-07-13

## (undated) DEVICE — SPECIMEN BAG BEMIS HI FLOW SUCTION WHITE SOCK 533810

## (undated) DEVICE — GLOVE PROTEXIS BLUE W/NEU-THERA 7.0  2D73EB70

## (undated) DEVICE — SOL NACL 0.9% IRRIG 3000ML BAG 2B7477

## (undated) DEVICE — PAD CHUX UNDERPAD 30X30"

## (undated) DEVICE — TUBING SYS AQUILEX BLUE INFLOW AQL-110 YLW OUTFLOW AQL-111

## (undated) DEVICE — ASPIRATOR DBL VALVE IPASS MVA PLUS DVS-SU DVS-S10

## (undated) DEVICE — GLOVE PROTEXIS W/NEU-THERA 6.5  2D73TE65

## (undated) DEVICE — DECANTER TRANSFER DEVICE 2008S

## (undated) DEVICE — LINEN GOWN X4 5410

## (undated) DEVICE — Device

## (undated) DEVICE — SUCTION CANNULA UTERINE 07MM CVD  21853

## (undated) DEVICE — SOL WATER IRRIG 1000ML BOTTLE 2F7114

## (undated) DEVICE — SOL NACL 0.9% IRRIG 1000ML BOTTLE 2F7124

## (undated) DEVICE — COVER CAMERA IN-LIGHT DISP LT-C02

## (undated) DEVICE — NDL SPINAL 22GA 3.5" QUINCKE 405181

## (undated) DEVICE — LINEN TOWEL PACK X5 5464

## (undated) DEVICE — TUBING VACUUM COLLECTION 6FT 23116

## (undated) DEVICE — STRAP KNEE/BODY 31143004

## (undated) DEVICE — SEAL SET MYOSURE ROD LENS SCOPE SINGLE USE 40-902

## (undated) RX ORDER — OXYTOCIN 10 [USP'U]/ML
INJECTION, SOLUTION INTRAMUSCULAR; INTRAVENOUS
Status: DISPENSED
Start: 2017-10-20

## (undated) RX ORDER — DEXAMETHASONE SODIUM PHOSPHATE 4 MG/ML
INJECTION, SOLUTION INTRA-ARTICULAR; INTRALESIONAL; INTRAMUSCULAR; INTRAVENOUS; SOFT TISSUE
Status: DISPENSED
Start: 2017-12-15

## (undated) RX ORDER — FENTANYL CITRATE 50 UG/ML
INJECTION, SOLUTION INTRAMUSCULAR; INTRAVENOUS
Status: DISPENSED
Start: 2017-10-20

## (undated) RX ORDER — ACETAMINOPHEN 325 MG/1
TABLET ORAL
Status: DISPENSED
Start: 2017-12-15

## (undated) RX ORDER — GABAPENTIN 300 MG/1
CAPSULE ORAL
Status: DISPENSED
Start: 2017-12-15

## (undated) RX ORDER — LIDOCAINE HYDROCHLORIDE 10 MG/ML
INJECTION, SOLUTION EPIDURAL; INFILTRATION; INTRACAUDAL; PERINEURAL
Status: DISPENSED
Start: 2017-10-20

## (undated) RX ORDER — DOXYCYCLINE 100 MG/10ML
INJECTION, POWDER, LYOPHILIZED, FOR SOLUTION INTRAVENOUS
Status: DISPENSED
Start: 2017-10-20

## (undated) RX ORDER — PROPOFOL 10 MG/ML
INJECTION, EMULSION INTRAVENOUS
Status: DISPENSED
Start: 2017-10-20

## (undated) RX ORDER — FENTANYL CITRATE 50 UG/ML
INJECTION, SOLUTION INTRAMUSCULAR; INTRAVENOUS
Status: DISPENSED
Start: 2017-12-15

## (undated) RX ORDER — KETOROLAC TROMETHAMINE 30 MG/ML
INJECTION, SOLUTION INTRAMUSCULAR; INTRAVENOUS
Status: DISPENSED
Start: 2017-12-15

## (undated) RX ORDER — PROPOFOL 10 MG/ML
INJECTION, EMULSION INTRAVENOUS
Status: DISPENSED
Start: 2017-12-15

## (undated) RX ORDER — ONDANSETRON 2 MG/ML
INJECTION INTRAMUSCULAR; INTRAVENOUS
Status: DISPENSED
Start: 2017-10-20

## (undated) RX ORDER — ONDANSETRON 2 MG/ML
INJECTION INTRAMUSCULAR; INTRAVENOUS
Status: DISPENSED
Start: 2017-12-15

## (undated) RX ORDER — DEXAMETHASONE SODIUM PHOSPHATE 4 MG/ML
INJECTION, SOLUTION INTRA-ARTICULAR; INTRALESIONAL; INTRAMUSCULAR; INTRAVENOUS; SOFT TISSUE
Status: DISPENSED
Start: 2017-10-20